# Patient Record
Sex: FEMALE | Race: WHITE | NOT HISPANIC OR LATINO | Employment: FULL TIME | URBAN - METROPOLITAN AREA
[De-identification: names, ages, dates, MRNs, and addresses within clinical notes are randomized per-mention and may not be internally consistent; named-entity substitution may affect disease eponyms.]

---

## 2019-03-16 ENCOUNTER — HOSPITAL ENCOUNTER (OUTPATIENT)
Dept: LAB | Facility: MEDICAL CENTER | Age: 29
End: 2019-03-16
Attending: STUDENT IN AN ORGANIZED HEALTH CARE EDUCATION/TRAINING PROGRAM
Payer: COMMERCIAL

## 2019-03-16 ENCOUNTER — HOSPITAL ENCOUNTER (OUTPATIENT)
Dept: LAB | Facility: MEDICAL CENTER | Age: 29
End: 2019-03-16
Attending: FAMILY MEDICINE
Payer: COMMERCIAL

## 2019-03-16 LAB
B-HCG SERPL-ACNC: <0.6 MIU/ML (ref 0–5)
BASOPHILS # BLD AUTO: 0.7 % (ref 0–1.8)
BASOPHILS # BLD: 0.05 K/UL (ref 0–0.12)
EOSINOPHIL # BLD AUTO: 0.08 K/UL (ref 0–0.51)
EOSINOPHIL NFR BLD: 1.1 % (ref 0–6.9)
ERYTHROCYTE [DISTWIDTH] IN BLOOD BY AUTOMATED COUNT: 43.9 FL (ref 35.9–50)
HCT VFR BLD AUTO: 44.3 % (ref 37–47)
HGB BLD-MCNC: 14.3 G/DL (ref 12–16)
IMM GRANULOCYTES # BLD AUTO: 0.01 K/UL (ref 0–0.11)
IMM GRANULOCYTES NFR BLD AUTO: 0.1 % (ref 0–0.9)
LYMPHOCYTES # BLD AUTO: 2.47 K/UL (ref 1–4.8)
LYMPHOCYTES NFR BLD: 34.5 % (ref 22–41)
MCH RBC QN AUTO: 30.5 PG (ref 27–33)
MCHC RBC AUTO-ENTMCNC: 32.3 G/DL (ref 33.6–35)
MCV RBC AUTO: 94.5 FL (ref 81.4–97.8)
MONOCYTES # BLD AUTO: 0.63 K/UL (ref 0–0.85)
MONOCYTES NFR BLD AUTO: 8.8 % (ref 0–13.4)
NEUTROPHILS # BLD AUTO: 3.91 K/UL (ref 2–7.15)
NEUTROPHILS NFR BLD: 54.8 % (ref 44–72)
NRBC # BLD AUTO: 0 K/UL
NRBC BLD-RTO: 0 /100 WBC
PLATELET # BLD AUTO: 187 K/UL (ref 164–446)
PMV BLD AUTO: 12.9 FL (ref 9–12.9)
RBC # BLD AUTO: 4.69 M/UL (ref 4.2–5.4)
TSH SERPL DL<=0.005 MIU/L-ACNC: 0.78 UIU/ML (ref 0.38–5.33)
WBC # BLD AUTO: 7.2 K/UL (ref 4.8–10.8)

## 2019-03-16 PROCEDURE — 84443 ASSAY THYROID STIM HORMONE: CPT

## 2019-03-16 PROCEDURE — 84702 CHORIONIC GONADOTROPIN TEST: CPT

## 2019-03-16 PROCEDURE — 85025 COMPLETE CBC W/AUTO DIFF WBC: CPT

## 2019-03-16 PROCEDURE — 36415 COLL VENOUS BLD VENIPUNCTURE: CPT

## 2023-04-03 ENCOUNTER — OFFICE VISIT (OUTPATIENT)
Dept: URGENT CARE | Facility: PHYSICIAN GROUP | Age: 33
End: 2023-04-03
Payer: COMMERCIAL

## 2023-04-03 VITALS
WEIGHT: 171 LBS | TEMPERATURE: 97.9 F | DIASTOLIC BLOOD PRESSURE: 80 MMHG | OXYGEN SATURATION: 96 % | BODY MASS INDEX: 31.47 KG/M2 | HEART RATE: 87 BPM | HEIGHT: 62 IN | RESPIRATION RATE: 18 BRPM | SYSTOLIC BLOOD PRESSURE: 120 MMHG

## 2023-04-03 DIAGNOSIS — H11.31 CONJUNCTIVAL HEMORRHAGE OF RIGHT EYE: ICD-10-CM

## 2023-04-03 PROCEDURE — 99203 OFFICE O/P NEW LOW 30 MIN: CPT | Performed by: FAMILY MEDICINE

## 2023-04-03 ASSESSMENT — ENCOUNTER SYMPTOMS
EYE REDNESS: 1
EYE PAIN: 1

## 2023-04-03 NOTE — PROGRESS NOTES
"Subjective:   Pamela Kerr is a 33 y.o. female who presents for Eye Pain (Right eye pain, x3 days )      Eye Pain   Associated symptoms include eye redness.     Review of Systems   Eyes:  Positive for pain and redness.     Medications, Allergies, and current problem list reviewed today in Epic.     Objective:     /80 (BP Location: Right arm, Patient Position: Sitting, BP Cuff Size: Adult)   Pulse 87   Temp 36.6 °C (97.9 °F) (Temporal)   Resp 18   Ht 1.562 m (5' 1.5\")   Wt 77.6 kg (171 lb)   SpO2 96%     Physical Exam  Vitals and nursing note reviewed.   Constitutional:       Appearance: Normal appearance.   HENT:      Head: Normocephalic and atraumatic.      Right Ear: Tympanic membrane normal.      Left Ear: Tympanic membrane normal.      Nose: Nose normal.      Mouth/Throat:      Pharynx: Oropharynx is clear.   Eyes:      Comments: Right conjunctiva hemorrage, lens clear, no retina normal appearing, no discharge   Neurological:      Mental Status: She is alert.       Assessment/Plan:     Diagnosis and associated orders:     1. Conjunctival hemorrhage of right eye           Comments/MDM:     Observation, tylenol for discomfort         Differential diagnosis, natural history, supportive care, and indications for immediate follow-up discussed.    Advised the patient to follow-up with the primary care physician for recheck, reevaluation, and consideration of further management.    Please note that this dictation was created using voice recognition software. I have made a reasonable attempt to correct obvious errors, but I expect that there are errors of grammar and possibly content that I did not discover before finalizing the note.    This note was electronically signed by Wilfrido Garber M.D.  "

## 2024-11-28 ENCOUNTER — APPOINTMENT (OUTPATIENT)
Dept: RADIOLOGY | Facility: MEDICAL CENTER | Age: 34
End: 2024-11-28
Attending: OBSTETRICS & GYNECOLOGY
Payer: COMMERCIAL

## 2024-11-28 ENCOUNTER — PHARMACY VISIT (OUTPATIENT)
Dept: PHARMACY | Facility: MEDICAL CENTER | Age: 34
End: 2024-11-28
Payer: COMMERCIAL

## 2024-11-28 ENCOUNTER — HOSPITAL ENCOUNTER (EMERGENCY)
Facility: MEDICAL CENTER | Age: 34
End: 2024-11-28
Attending: OBSTETRICS & GYNECOLOGY | Admitting: OBSTETRICS & GYNECOLOGY
Payer: COMMERCIAL

## 2024-11-28 VITALS
RESPIRATION RATE: 16 BRPM | BODY MASS INDEX: 36.8 KG/M2 | TEMPERATURE: 97.8 F | SYSTOLIC BLOOD PRESSURE: 142 MMHG | WEIGHT: 200 LBS | OXYGEN SATURATION: 98 % | HEART RATE: 98 BPM | DIASTOLIC BLOOD PRESSURE: 78 MMHG | HEIGHT: 62 IN

## 2024-11-28 LAB
ALBUMIN SERPL BCP-MCNC: 3.6 G/DL (ref 3.2–4.9)
ALBUMIN/GLOB SERPL: 1.1 G/DL
ALP SERPL-CCNC: 162 U/L (ref 30–99)
ALT SERPL-CCNC: 15 U/L (ref 2–50)
ANION GAP SERPL CALC-SCNC: 12 MMOL/L (ref 7–16)
ANISOCYTOSIS BLD QL SMEAR: ABNORMAL
APPEARANCE UR: CLEAR
APPEARANCE UR: CLEAR
AST SERPL-CCNC: 16 U/L (ref 12–45)
BASOPHILS # BLD AUTO: 0 % (ref 0–1.8)
BASOPHILS # BLD: 0 K/UL (ref 0–0.12)
BILIRUB SERPL-MCNC: 0.2 MG/DL (ref 0.1–1.5)
BILIRUB UR QL STRIP.AUTO: NEGATIVE
BILIRUB UR QL STRIP.AUTO: NEGATIVE
BUN SERPL-MCNC: 7 MG/DL (ref 8–22)
CALCIUM ALBUM COR SERPL-MCNC: 9.8 MG/DL (ref 8.5–10.5)
CALCIUM SERPL-MCNC: 9.5 MG/DL (ref 8.5–10.5)
CHLORIDE SERPL-SCNC: 103 MMOL/L (ref 96–112)
CO2 SERPL-SCNC: 21 MMOL/L (ref 20–33)
COLOR UR AUTO: YELLOW
COLOR UR: YELLOW
CREAT SERPL-MCNC: 0.51 MG/DL (ref 0.5–1.4)
CREAT UR-MCNC: 69.5 MG/DL
EOSINOPHIL # BLD AUTO: 0 K/UL (ref 0–0.51)
EOSINOPHIL NFR BLD: 0 % (ref 0–6.9)
ERYTHROCYTE [DISTWIDTH] IN BLOOD BY AUTOMATED COUNT: 45.5 FL (ref 35.9–50)
GFR SERPLBLD CREATININE-BSD FMLA CKD-EPI: 127 ML/MIN/1.73 M 2
GLOBULIN SER CALC-MCNC: 3.2 G/DL (ref 1.9–3.5)
GLUCOSE SERPL-MCNC: 75 MG/DL (ref 65–99)
GLUCOSE UR QL STRIP.AUTO: NEGATIVE MG/DL
GLUCOSE UR STRIP.AUTO-MCNC: NEGATIVE MG/DL
HCT VFR BLD AUTO: 40.3 % (ref 37–47)
HGB BLD-MCNC: 13.6 G/DL (ref 12–16)
KETONES UR QL STRIP.AUTO: NEGATIVE MG/DL
KETONES UR STRIP.AUTO-MCNC: NEGATIVE MG/DL
LEUKOCYTE ESTERASE UR QL STRIP.AUTO: ABNORMAL
LEUKOCYTE ESTERASE UR QL STRIP.AUTO: NEGATIVE
LG PLATELETS BLD QL SMEAR: NORMAL
LYMPHOCYTES # BLD AUTO: 1.65 K/UL (ref 1–4.8)
LYMPHOCYTES NFR BLD: 13.2 % (ref 22–41)
MANUAL DIFF BLD: NORMAL
MCH RBC QN AUTO: 30 PG (ref 27–33)
MCHC RBC AUTO-ENTMCNC: 33.7 G/DL (ref 32.2–35.5)
MCV RBC AUTO: 88.8 FL (ref 81.4–97.8)
MICRO URNS: NORMAL
MICROCYTES BLD QL SMEAR: ABNORMAL
MONOCYTES # BLD AUTO: 0.33 K/UL (ref 0–0.85)
MONOCYTES NFR BLD AUTO: 2.6 % (ref 0–13.4)
MORPHOLOGY BLD-IMP: NORMAL
NEUTROPHILS # BLD AUTO: 10.53 K/UL (ref 1.82–7.42)
NEUTROPHILS NFR BLD: 84.2 % (ref 44–72)
NITRITE UR QL STRIP.AUTO: NEGATIVE
NITRITE UR QL STRIP.AUTO: NEGATIVE
NRBC # BLD AUTO: 0 K/UL
NRBC BLD-RTO: 0 /100 WBC (ref 0–0.2)
PH UR STRIP.AUTO: 7.5 [PH] (ref 5–8)
PH UR STRIP.AUTO: 8 [PH] (ref 5–8)
PLATELET # BLD AUTO: 143 K/UL (ref 164–446)
PLATELET # BLD AUTO: 157 K/UL (ref 164–446)
PLATELET BLD QL SMEAR: NORMAL
PMV BLD AUTO: 13.6 FL (ref 9–12.9)
POLYCHROMASIA BLD QL SMEAR: NORMAL
POTASSIUM SERPL-SCNC: 3.9 MMOL/L (ref 3.6–5.5)
PROT SERPL-MCNC: 6.8 G/DL (ref 6–8.2)
PROT UR QL STRIP: NEGATIVE MG/DL
PROT UR QL STRIP: NEGATIVE MG/DL
PROT UR-MCNC: 12.3 MG/DL (ref 0–15)
PROT/CREAT UR: 177 MG/G (ref 10–107)
RBC # BLD AUTO: 4.54 M/UL (ref 4.2–5.4)
RBC BLD AUTO: PRESENT
RBC UR QL AUTO: NEGATIVE
RBC UR QL AUTO: NEGATIVE
SODIUM SERPL-SCNC: 136 MMOL/L (ref 135–145)
SP GR UR STRIP.AUTO: 1.02
SP GR UR STRIP.AUTO: 1.02 (ref 1–1.03)
URATE SERPL-MCNC: 5.8 MG/DL (ref 1.9–8.2)
UROBILINOGEN UR STRIP.AUTO-MCNC: 0.2 EU/DL
UROBILINOGEN UR STRIP.AUTO-MCNC: 0.2 MG/DL
WBC # BLD AUTO: 12.5 K/UL (ref 4.8–10.8)

## 2024-11-28 PROCEDURE — 700102 HCHG RX REV CODE 250 W/ 637 OVERRIDE(OP): Performed by: OBSTETRICS & GYNECOLOGY

## 2024-11-28 PROCEDURE — 81003 URINALYSIS AUTO W/O SCOPE: CPT

## 2024-11-28 PROCEDURE — A9270 NON-COVERED ITEM OR SERVICE: HCPCS | Performed by: OBSTETRICS & GYNECOLOGY

## 2024-11-28 PROCEDURE — 36415 COLL VENOUS BLD VENIPUNCTURE: CPT

## 2024-11-28 PROCEDURE — 82570 ASSAY OF URINE CREATININE: CPT

## 2024-11-28 PROCEDURE — 84550 ASSAY OF BLOOD/URIC ACID: CPT

## 2024-11-28 PROCEDURE — RXMED WILLOW AMBULATORY MEDICATION CHARGE: Performed by: OBSTETRICS & GYNECOLOGY

## 2024-11-28 PROCEDURE — 84156 ASSAY OF PROTEIN URINE: CPT

## 2024-11-28 PROCEDURE — 81002 URINALYSIS NONAUTO W/O SCOPE: CPT

## 2024-11-28 PROCEDURE — 93971 EXTREMITY STUDY: CPT | Mod: RT

## 2024-11-28 PROCEDURE — 85027 COMPLETE CBC AUTOMATED: CPT

## 2024-11-28 PROCEDURE — 99283 EMERGENCY DEPT VISIT LOW MDM: CPT

## 2024-11-28 PROCEDURE — 59025 FETAL NON-STRESS TEST: CPT

## 2024-11-28 PROCEDURE — 80053 COMPREHEN METABOLIC PANEL: CPT

## 2024-11-28 PROCEDURE — 85007 BL SMEAR W/DIFF WBC COUNT: CPT

## 2024-11-28 RX ORDER — LABETALOL 100 MG/1
TABLET, FILM COATED ORAL
Qty: 60 TABLET | Refills: 0 | OUTPATIENT
Start: 2024-11-28

## 2024-11-28 RX ORDER — LABETALOL 100 MG/1
100 TABLET, FILM COATED ORAL ONCE
Status: COMPLETED | OUTPATIENT
Start: 2024-11-28 | End: 2024-11-28

## 2024-11-28 RX ADMIN — LABETALOL HYDROCHLORIDE 100 MG: 100 TABLET, FILM COATED ORAL at 17:08

## 2024-11-28 NOTE — PROGRESS NOTES
1440 - Patient of Dr. Crooks presents with c/o various findings s/p elevated BP precautions.  Brar Gestation today at 36.3 weeks    Patient reports she had an elevated BP at the office yesterday of 180s. States she stayed for about an hour listening to relaxing music and resting and the BP resolved. She was given precautions and told to go to the hospital should she experience any of them.     Reports she has had a HA this week that resolved with tylenol. And occasional vision changes while in the process of standing or in the shower.    Today patient reports right leg/ankle swelling around 11am after walking around the store - resolved with rest/elevation/ice but then became swollen again after she resumed activity. Denies pain or discomfort to the leg but states she experienced a couple hours of discomfort to the right groin on Sunday.     DTRs 1-2+   without clonus     No areas of redness/heat noted to the right leg, no swelling of either extremity noted - slight indention on ankle from the sock elastic, bilaterally.     Denies contractions/cramping - report occasional BH contractions. Denies ROM or bleeding. Denies current problem to vision/edema/HA. Reports FM. FM/TOCO use discussed and placed, POC discussed. DAVE Darling at the .     Dry erase board updated with RN contact information; reviewed.   Patient encouraged to call RN with all questions concerns needs prn. Water available/encouraged at the BS - patients own water.     1511 - Report to Dr. Reeves regarding patient arrival/complaint/status. Orders received for PCR, CMP, CBC, uric acid, US venous doppler of lower right extremity. POC discussed with the patient.     1600 - Patient denies s/s of a UTI, reports she is susceptible to frequent UTI's though.     1605 - US/Tech at the BS     1635 - Lab called to say the platelets are clumping and need to be drawn again. Update to patient.  1640 - Phlebotomist at the .     1659 - Order received for 100  labetalol now. Ordered. Discussed with the patient/FOB.   1708 - Administered    1745 - Dr. Reeves is at the  assessing current maternal/fetal status and POC for discharge home and follow up at scheduled appointment this Wednesday 1810 - Patient discharged home with specific instruction to return to L&D/Physician ie.. Bleeding/ROM/decreased FM/labor/concerns for self or baby..       Of side note > Volume on the TV remove is not working - remote replace did not resolve the issue. Patient offered to change rooms, denies need.

## 2024-11-29 NOTE — DISCHARGE INSTRUCTIONS
Hypertension During Pregnancy  Hypertension is also called high blood pressure. High blood pressure means that the force of the blood moving in your body is high enough to cause problems for you and your baby. Different types of high blood pressure can happen during pregnancy. The types are:  High blood pressure before you got pregnant. This is called chronic hypertension.  This can continue during your pregnancy. Your doctor will want to keep checking your blood pressure. You may need medicine to control your blood pressure while you are pregnant. You will need follow-up visits after you have your baby.  High blood pressure that goes up during pregnancy when it was normal before. This is called gestational hypertension. It will often get better after you have your baby, but your doctor will need to watch your blood pressure to make sure that it is getting better.  You may develop high blood pressure after giving birth. This is called postpartum hypertension. This often occurs within 48 hours after childbirth but may occur up to 6 weeks after giving birth.  Very high blood pressure during pregnancy is an emergency that needs treatment right away.  How does this affect me?  If you have high blood pressure during pregnancy, you have a higher chance of developing high blood pressure:  As you get older.  If you get pregnant again.  In some cases, high blood pressure during pregnancy can cause:  Stroke.  Heart attack.  Damage to the kidneys, lungs, or liver.  Preeclampsia.  HELLP syndrome.  Seizures.  Problems with the placenta.  How does this affect my baby?  Your baby may:  Be born early.  Not weigh as much as he or she should.  Not handle labor well, leading to a .  This condition may also result in a baby's death before birth (stillbirth).  What are the risks?  Having high blood pressure during a past pregnancy.  Being overweight.  Being age 35 or older.  Being pregnant for the first time.  Being pregnant  with more than one baby.  Becoming pregnant using fertility methods, such as IVF.  Having other problems, such as diabetes or kidney disease.  What can I do to lower my risk?    Keep a healthy weight.  Eat a healthy diet.  Follow what your doctor tells you about treating any medical problems that you had before you got pregnant.  It is very important to go to all of your doctor visits. Your doctor will check your blood pressure and make sure that your pregnancy is progressing as it should. Treatment should start early if a problem is found.  How is this treated?  Treatment for high blood pressure during pregnancy can vary. It depends on the type of high blood pressure you have and how serious it is.  If you were taking medicine for your blood pressure before you got pregnant, talk with your doctor. You may need to change the medicine during pregnancy if it is not safe for your baby.  If your blood pressure goes up during pregnancy, your doctor may order medicine to treat this.  If you are at risk for preeclampsia, your doctor may tell you to take a low-dose aspirin while you are pregnant.  If you have very high blood pressure, you may need to stay in the hospital so you and your baby can be watched closely. You may also need to take medicine to lower your blood pressure.  In some cases, if your condition gets worse, you may need to have your baby early.  Follow these instructions at home:  Eating and drinking    Drink enough fluid to keep your pee (urine) pale yellow.  Avoid caffeine.  Lifestyle  Do not smoke or use any products that contain nicotine or tobacco. If you need help quitting, ask your doctor.  Do not use alcohol or drugs.  Avoid stress.  Rest and get plenty of sleep.  Regular exercise can help. Ask your doctor what kinds of exercise are best for you.  General instructions  Take over-the-counter and prescription medicines only as told by your doctor.  Keep all prenatal and follow-up visits.  Contact a  doctor if:  You have symptoms that your doctor told you to watch for, such as:  Headaches.  A feeling like you may vomit (nausea).  Vomiting.  Belly (abdominal) pain.  Feeling dizzy or light-headed.  Get help right away if:  You have symptoms of serious problems, such as:  Very bad belly pain that does not get better with treatment.  A very bad headache that does not get better.  Blurry vision.  Double vision.  Vomiting that does not get better.  Sudden, fast weight gain.  Sudden swelling in your hands, ankles, or face.  Bleeding from your vagina.  Blood in your pee.  Shortness of breath.  Chest pain.  Weakness on one side of your body.  Trouble talking.  Your baby is not moving as much as usual.  These symptoms may be an emergency. Get help right away. Call your local emergency services (911 in the [place]  Do not wait to see if the symptoms will go away.  Do not drive yourself to the hospital.  Summary  High blood pressure is also called hypertension.  High blood pressure means that the force of the blood moving in your body is high enough to cause problems for you and your baby.  Get help right away if you have symptoms of serious problems due to high blood pressure.  Keep all prenatal and follow-up visits.  This information is not intended to replace advice given to you by your health care provider. Make sure you discuss any questions you have with your health care provider.  Document Revised: 09/09/2021 Document Reviewed: 09/09/2021  [employer] Patient Education © 2023 [employer]

## 2024-11-29 NOTE — PROGRESS NOTES
OB ED note    Pt called with increased swelling in her right LE.  Was seen yesterday by her Primary Ob, Dr. Crooks, and her BP was 182/100.  Pt rested and BP decreased.  She was sent home and instructed to monitor for swelling and HA.  Pt noticed swelling in her right LE which resolved with elevation and ice, but then returned. She also had right groin pain last week. She was advised to come in for additional evaluation.       - 149  DBP 80 - 95 P 97  T 97.8  FHTs 140s Cat 1  [place] Occasional  LE: No edema, no cords or Homans.  DTRs 2+, no clonus  Labs  Plts 157K  AST 16 ALT 15  Uric Acid 5.8  Pro/Cr ratio 177  LE Doppler - no DVT    A/P:  IUP at 36 3/7 weeks - likely gestational HTN - doing well   Pt given Labetalol 100 mg PO and BP improved. Rx called into [facility] outpatient pharmacy.  Pt advise to obtain BP cuff.  Parameters reviewed  F/U Dr. Crooks as scheduled  Return to L&D for PTL or sx of pre-eclampsia

## 2024-12-01 ENCOUNTER — HOSPITAL ENCOUNTER (EMERGENCY)
Facility: MEDICAL CENTER | Age: 34
End: 2024-12-01
Attending: OBSTETRICS & GYNECOLOGY | Admitting: OBSTETRICS & GYNECOLOGY
Payer: COMMERCIAL

## 2024-12-01 VITALS
RESPIRATION RATE: 16 BRPM | DIASTOLIC BLOOD PRESSURE: 88 MMHG | HEART RATE: 94 BPM | OXYGEN SATURATION: 98 % | BODY MASS INDEX: 36.8 KG/M2 | WEIGHT: 200 LBS | SYSTOLIC BLOOD PRESSURE: 149 MMHG | HEIGHT: 62 IN | TEMPERATURE: 97.3 F

## 2024-12-01 LAB
A1 MICROGLOB PLACENTAL VAG QL: NEGATIVE
ALBUMIN SERPL BCP-MCNC: 3.7 G/DL (ref 3.2–4.9)
ALBUMIN/GLOB SERPL: 1.3 G/DL
ALP SERPL-CCNC: 162 U/L (ref 30–99)
ALT SERPL-CCNC: 17 U/L (ref 2–50)
ANION GAP SERPL CALC-SCNC: 15 MMOL/L (ref 7–16)
AST SERPL-CCNC: 17 U/L (ref 12–45)
BILIRUB SERPL-MCNC: 0.2 MG/DL (ref 0.1–1.5)
BUN SERPL-MCNC: 8 MG/DL (ref 8–22)
CALCIUM ALBUM COR SERPL-MCNC: 9.9 MG/DL (ref 8.5–10.5)
CALCIUM SERPL-MCNC: 9.7 MG/DL (ref 8.5–10.5)
CHLORIDE SERPL-SCNC: 104 MMOL/L (ref 96–112)
CO2 SERPL-SCNC: 18 MMOL/L (ref 20–33)
CREAT SERPL-MCNC: 0.57 MG/DL (ref 0.5–1.4)
CREAT UR-MCNC: 63.22 MG/DL
ERYTHROCYTE [DISTWIDTH] IN BLOOD BY AUTOMATED COUNT: 46.4 FL (ref 35.9–50)
GFR SERPLBLD CREATININE-BSD FMLA CKD-EPI: 123 ML/MIN/1.73 M 2
GLOBULIN SER CALC-MCNC: 2.9 G/DL (ref 1.9–3.5)
GLUCOSE SERPL-MCNC: 124 MG/DL (ref 65–99)
HCT VFR BLD AUTO: 37.7 % (ref 37–47)
HGB BLD-MCNC: 12.7 G/DL (ref 12–16)
HOLDING TUBE BB 8507: NORMAL
MCH RBC QN AUTO: 30.2 PG (ref 27–33)
MCHC RBC AUTO-ENTMCNC: 33.7 G/DL (ref 32.2–35.5)
MCV RBC AUTO: 89.5 FL (ref 81.4–97.8)
PLATELET # BLD AUTO: 155 K/UL (ref 164–446)
PMV BLD AUTO: 14.2 FL (ref 9–12.9)
POTASSIUM SERPL-SCNC: 3.7 MMOL/L (ref 3.6–5.5)
PROT SERPL-MCNC: 6.6 G/DL (ref 6–8.2)
PROT UR-MCNC: 7 MG/DL (ref 0–15)
PROT/CREAT UR: 111 MG/G (ref 10–107)
RBC # BLD AUTO: 4.21 M/UL (ref 4.2–5.4)
SODIUM SERPL-SCNC: 137 MMOL/L (ref 135–145)
WBC # BLD AUTO: 12.4 K/UL (ref 4.8–10.8)

## 2024-12-01 PROCEDURE — 85027 COMPLETE CBC AUTOMATED: CPT

## 2024-12-01 PROCEDURE — 99282 EMERGENCY DEPT VISIT SF MDM: CPT

## 2024-12-01 PROCEDURE — 59025 FETAL NON-STRESS TEST: CPT

## 2024-12-01 PROCEDURE — 82570 ASSAY OF URINE CREATININE: CPT

## 2024-12-01 PROCEDURE — 84156 ASSAY OF PROTEIN URINE: CPT

## 2024-12-01 PROCEDURE — 80053 COMPREHEN METABOLIC PANEL: CPT

## 2024-12-01 PROCEDURE — 84112 EVAL AMNIOTIC FLUID PROTEIN: CPT

## 2024-12-01 PROCEDURE — 36415 COLL VENOUS BLD VENIPUNCTURE: CPT

## 2024-12-01 ASSESSMENT — FIBROSIS 4 INDEX: FIB4 SCORE: 0.84

## 2024-12-01 ASSESSMENT — PAIN SCALES - GENERAL: PAINLEVEL: 0 - NO PAIN

## 2024-12-02 NOTE — PROGRESS NOTES
", EDC , [place] 37w. Patient presents to L&D with c/o elevated BP (130-152/). Patient denies VB or UCs and reports + fetal movement. Patient states she woke up from a nap at approx 1300 \"feeling wet down there.\" Patient escorted to triage room, oriented to room and unit, and external monitors applied. POC discussed with patient and s/o and encouraged to state needs or questions at any time.    1529 Dr. Santiago given report, orders received.    1624 Dr. Santiago updated, D/C order received. Will see patient prior to D/C.    1635 Dr. Santiago at bedside, POC discussed with patient.    1651 L&D D/C instructions reviewed with patient and s/o who state understanding. Patient d/c to self with s/o.  "

## 2024-12-03 ENCOUNTER — ANESTHESIA EVENT (OUTPATIENT)
Dept: ANESTHESIOLOGY | Facility: MEDICAL CENTER | Age: 34
End: 2024-12-03
Payer: COMMERCIAL

## 2024-12-03 ENCOUNTER — ANESTHESIA (OUTPATIENT)
Dept: ANESTHESIOLOGY | Facility: MEDICAL CENTER | Age: 34
End: 2024-12-03
Payer: COMMERCIAL

## 2024-12-03 ENCOUNTER — APPOINTMENT (OUTPATIENT)
Dept: OBGYN | Facility: MEDICAL CENTER | Age: 34
End: 2024-12-03
Attending: OBSTETRICS & GYNECOLOGY
Payer: COMMERCIAL

## 2024-12-03 ENCOUNTER — HOSPITAL ENCOUNTER (INPATIENT)
Facility: MEDICAL CENTER | Age: 34
LOS: 2 days | End: 2024-12-05
Attending: OBSTETRICS & GYNECOLOGY | Admitting: OBSTETRICS & GYNECOLOGY
Payer: COMMERCIAL

## 2024-12-03 DIAGNOSIS — Z78.9 BREASTFEEDING (INFANT): ICD-10-CM

## 2024-12-03 LAB
BASOPHILS # BLD AUTO: 0.3 % (ref 0–1.8)
BASOPHILS # BLD: 0.04 K/UL (ref 0–0.12)
EOSINOPHIL # BLD AUTO: 0.1 K/UL (ref 0–0.51)
EOSINOPHIL NFR BLD: 0.9 % (ref 0–6.9)
ERYTHROCYTE [DISTWIDTH] IN BLOOD BY AUTOMATED COUNT: 46.3 FL (ref 35.9–50)
HCT VFR BLD AUTO: 38.1 % (ref 37–47)
HGB BLD-MCNC: 12.5 G/DL (ref 12–16)
HOLDING TUBE BB 8507: NORMAL
IMM GRANULOCYTES # BLD AUTO: 0.07 K/UL (ref 0–0.11)
IMM GRANULOCYTES NFR BLD AUTO: 0.6 % (ref 0–0.9)
LYMPHOCYTES # BLD AUTO: 2.6 K/UL (ref 1–4.8)
LYMPHOCYTES NFR BLD: 22.7 % (ref 22–41)
MCH RBC QN AUTO: 29.3 PG (ref 27–33)
MCHC RBC AUTO-ENTMCNC: 32.8 G/DL (ref 32.2–35.5)
MCV RBC AUTO: 89.4 FL (ref 81.4–97.8)
MONOCYTES # BLD AUTO: 0.95 K/UL (ref 0–0.85)
MONOCYTES NFR BLD AUTO: 8.3 % (ref 0–13.4)
NEUTROPHILS # BLD AUTO: 7.7 K/UL (ref 1.82–7.42)
NEUTROPHILS NFR BLD: 67.2 % (ref 44–72)
NRBC # BLD AUTO: 0 K/UL
NRBC BLD-RTO: 0 /100 WBC (ref 0–0.2)
PLATELET # BLD AUTO: 166 K/UL (ref 164–446)
PMV BLD AUTO: 14.3 FL (ref 9–12.9)
RBC # BLD AUTO: 4.26 M/UL (ref 4.2–5.4)
T PALLIDUM AB SER QL IA: NORMAL
WBC # BLD AUTO: 11.5 K/UL (ref 4.8–10.8)

## 2024-12-03 PROCEDURE — 700111 HCHG RX REV CODE 636 W/ 250 OVERRIDE (IP): Mod: JZ | Performed by: ANESTHESIOLOGY

## 2024-12-03 PROCEDURE — 700111 HCHG RX REV CODE 636 W/ 250 OVERRIDE (IP): Mod: JZ | Performed by: OBSTETRICS & GYNECOLOGY

## 2024-12-03 PROCEDURE — 700102 HCHG RX REV CODE 250 W/ 637 OVERRIDE(OP): Performed by: OBSTETRICS & GYNECOLOGY

## 2024-12-03 PROCEDURE — 770002 HCHG ROOM/CARE - OB PRIVATE (112)

## 2024-12-03 PROCEDURE — 700105 HCHG RX REV CODE 258: Performed by: OBSTETRICS & GYNECOLOGY

## 2024-12-03 PROCEDURE — 700101 HCHG RX REV CODE 250: Performed by: ANESTHESIOLOGY

## 2024-12-03 PROCEDURE — 36415 COLL VENOUS BLD VENIPUNCTURE: CPT

## 2024-12-03 PROCEDURE — 303615 HCHG EPIDURAL/SPINAL ANESTHESIA FOR LABOR

## 2024-12-03 PROCEDURE — 86780 TREPONEMA PALLIDUM: CPT

## 2024-12-03 PROCEDURE — 86901 BLOOD TYPING SEROLOGIC RH(D): CPT

## 2024-12-03 PROCEDURE — 85025 COMPLETE CBC W/AUTO DIFF WBC: CPT

## 2024-12-03 PROCEDURE — 86900 BLOOD TYPING SEROLOGIC ABO: CPT

## 2024-12-03 PROCEDURE — A9270 NON-COVERED ITEM OR SERVICE: HCPCS | Performed by: OBSTETRICS & GYNECOLOGY

## 2024-12-03 RX ORDER — CARBOPROST TROMETHAMINE 250 UG/ML
250 INJECTION, SOLUTION INTRAMUSCULAR
Status: DISCONTINUED | OUTPATIENT
Start: 2024-12-03 | End: 2024-12-04 | Stop reason: HOSPADM

## 2024-12-03 RX ORDER — SODIUM CHLORIDE, SODIUM LACTATE, POTASSIUM CHLORIDE, AND CALCIUM CHLORIDE .6; .31; .03; .02 G/100ML; G/100ML; G/100ML; G/100ML
250 INJECTION, SOLUTION INTRAVENOUS PRN
Status: DISCONTINUED | OUTPATIENT
Start: 2024-12-03 | End: 2024-12-04

## 2024-12-03 RX ORDER — ROPIVACAINE HYDROCHLORIDE 2 MG/ML
INJECTION, SOLUTION EPIDURAL; INFILTRATION; PERINEURAL CONTINUOUS
Status: DISCONTINUED | OUTPATIENT
Start: 2024-12-03 | End: 2024-12-04

## 2024-12-03 RX ORDER — ALUMINA, MAGNESIA, AND SIMETHICONE 2400; 2400; 240 MG/30ML; MG/30ML; MG/30ML
30 SUSPENSION ORAL EVERY 6 HOURS PRN
Status: DISCONTINUED | OUTPATIENT
Start: 2024-12-03 | End: 2024-12-04 | Stop reason: HOSPADM

## 2024-12-03 RX ORDER — ACETAMINOPHEN 500 MG
1000 TABLET ORAL
Status: COMPLETED | OUTPATIENT
Start: 2024-12-03 | End: 2024-12-04

## 2024-12-03 RX ORDER — MISOPROSTOL 200 UG/1
800 TABLET ORAL
Status: DISCONTINUED | OUTPATIENT
Start: 2024-12-03 | End: 2024-12-04 | Stop reason: HOSPADM

## 2024-12-03 RX ORDER — ONDANSETRON 2 MG/ML
4 INJECTION INTRAMUSCULAR; INTRAVENOUS EVERY 6 HOURS PRN
Status: DISCONTINUED | OUTPATIENT
Start: 2024-12-03 | End: 2024-12-04 | Stop reason: HOSPADM

## 2024-12-03 RX ORDER — LABETALOL 100 MG/1
100 TABLET, FILM COATED ORAL TWICE DAILY
Status: DISCONTINUED | OUTPATIENT
Start: 2024-12-03 | End: 2024-12-04

## 2024-12-03 RX ORDER — NIFEDIPINE 10 MG/1
10 CAPSULE ORAL
Status: DISCONTINUED | OUTPATIENT
Start: 2024-12-03 | End: 2024-12-04 | Stop reason: HOSPADM

## 2024-12-03 RX ORDER — EPHEDRINE SULFATE 50 MG/ML
5 INJECTION, SOLUTION INTRAVENOUS
Status: DISCONTINUED | OUTPATIENT
Start: 2024-12-03 | End: 2024-12-04

## 2024-12-03 RX ORDER — ASPIRIN 81 MG/1
81 TABLET ORAL
Status: ON HOLD | COMMUNITY
End: 2024-12-05

## 2024-12-03 RX ORDER — ONDANSETRON 4 MG/1
4 TABLET, ORALLY DISINTEGRATING ORAL EVERY 6 HOURS PRN
Status: DISCONTINUED | OUTPATIENT
Start: 2024-12-03 | End: 2024-12-04 | Stop reason: HOSPADM

## 2024-12-03 RX ORDER — HYDRALAZINE HYDROCHLORIDE 20 MG/ML
5-10 INJECTION INTRAMUSCULAR; INTRAVENOUS
Status: DISCONTINUED | OUTPATIENT
Start: 2024-12-03 | End: 2024-12-04 | Stop reason: HOSPADM

## 2024-12-03 RX ORDER — LIDOCAINE HYDROCHLORIDE AND EPINEPHRINE 15; 5 MG/ML; UG/ML
INJECTION, SOLUTION EPIDURAL
Status: COMPLETED | OUTPATIENT
Start: 2024-12-03 | End: 2024-12-03

## 2024-12-03 RX ORDER — LABETALOL HYDROCHLORIDE 5 MG/ML
20-80 INJECTION, SOLUTION INTRAVENOUS
Status: DISCONTINUED | OUTPATIENT
Start: 2024-12-03 | End: 2024-12-04 | Stop reason: HOSPADM

## 2024-12-03 RX ORDER — IBUPROFEN 800 MG/1
800 TABLET, FILM COATED ORAL
Status: COMPLETED | OUTPATIENT
Start: 2024-12-03 | End: 2024-12-04

## 2024-12-03 RX ORDER — SODIUM PHOSPHATE,MONO-DIBASIC 19G-7G/118
1 ENEMA (ML) RECTAL
Status: DISCONTINUED | OUTPATIENT
Start: 2024-12-03 | End: 2024-12-04 | Stop reason: HOSPADM

## 2024-12-03 RX ORDER — SODIUM CHLORIDE, SODIUM LACTATE, POTASSIUM CHLORIDE, CALCIUM CHLORIDE 600; 310; 30; 20 MG/100ML; MG/100ML; MG/100ML; MG/100ML
INJECTION, SOLUTION INTRAVENOUS CONTINUOUS
Status: DISCONTINUED | OUTPATIENT
Start: 2024-12-03 | End: 2024-12-04

## 2024-12-03 RX ORDER — SODIUM CHLORIDE, SODIUM LACTATE, POTASSIUM CHLORIDE, AND CALCIUM CHLORIDE .6; .31; .03; .02 G/100ML; G/100ML; G/100ML; G/100ML
1000 INJECTION, SOLUTION INTRAVENOUS
Status: DISCONTINUED | OUTPATIENT
Start: 2024-12-03 | End: 2024-12-04

## 2024-12-03 RX ORDER — LIDOCAINE HYDROCHLORIDE 10 MG/ML
20 INJECTION, SOLUTION INFILTRATION; PERINEURAL
Status: DISCONTINUED | OUTPATIENT
Start: 2024-12-03 | End: 2024-12-04 | Stop reason: HOSPADM

## 2024-12-03 RX ORDER — TERBUTALINE SULFATE 1 MG/ML
0.25 INJECTION, SOLUTION SUBCUTANEOUS
Status: DISCONTINUED | OUTPATIENT
Start: 2024-12-03 | End: 2024-12-04 | Stop reason: HOSPADM

## 2024-12-03 RX ORDER — OXYTOCIN 10 [USP'U]/ML
10 INJECTION, SOLUTION INTRAMUSCULAR; INTRAVENOUS
Status: DISCONTINUED | OUTPATIENT
Start: 2024-12-03 | End: 2024-12-04 | Stop reason: HOSPADM

## 2024-12-03 RX ADMIN — SODIUM CHLORIDE, POTASSIUM CHLORIDE, SODIUM LACTATE AND CALCIUM CHLORIDE: 600; 310; 30; 20 INJECTION, SOLUTION INTRAVENOUS at 13:45

## 2024-12-03 RX ADMIN — ROPIVACAINE HYDROCHLORIDE: 2 INJECTION, SOLUTION EPIDURAL; INFILTRATION at 16:34

## 2024-12-03 RX ADMIN — LABETALOL HYDROCHLORIDE 100 MG: 100 TABLET, FILM COATED ORAL at 20:38

## 2024-12-03 RX ADMIN — OXYTOCIN 1 MILLI-UNITS/MIN: 10 INJECTION, SOLUTION INTRAMUSCULAR; INTRAVENOUS at 13:50

## 2024-12-03 RX ADMIN — LABETALOL HYDROCHLORIDE 100 MG: 100 TABLET, FILM COATED ORAL at 08:03

## 2024-12-03 RX ADMIN — SODIUM CHLORIDE, POTASSIUM CHLORIDE, SODIUM LACTATE AND CALCIUM CHLORIDE: 600; 310; 30; 20 INJECTION, SOLUTION INTRAVENOUS at 17:40

## 2024-12-03 RX ADMIN — MISOPROSTOL 25 MCG: 100 TABLET ORAL at 05:09

## 2024-12-03 RX ADMIN — LIDOCAINE HYDROCHLORIDE,EPINEPHRINE BITARTRATE 3 ML: 15; .005 INJECTION, SOLUTION EPIDURAL; INFILTRATION; INTRACAUDAL; PERINEURAL at 16:34

## 2024-12-03 SDOH — ECONOMIC STABILITY: TRANSPORTATION INSECURITY
IN THE PAST 12 MONTHS, HAS THE LACK OF TRANSPORTATION KEPT YOU FROM MEDICAL APPOINTMENTS OR FROM GETTING MEDICATIONS?: NO

## 2024-12-03 SDOH — ECONOMIC STABILITY: TRANSPORTATION INSECURITY
IN THE PAST 12 MONTHS, HAS LACK OF RELIABLE TRANSPORTATION KEPT YOU FROM MEDICAL APPOINTMENTS, MEETINGS, WORK OR FROM GETTING THINGS NEEDED FOR DAILY LIVING?: NO

## 2024-12-03 ASSESSMENT — SOCIAL DETERMINANTS OF HEALTH (SDOH)
IN THE PAST 12 MONTHS, HAS THE ELECTRIC, GAS, OIL, OR WATER COMPANY THREATENED TO SHUT OFF SERVICE IN YOUR HOME?: NO
WITHIN THE LAST YEAR, HAVE TO BEEN RAPED OR FORCED TO HAVE ANY KIND OF SEXUAL ACTIVITY BY YOUR PARTNER OR EX-PARTNER?: NO
WITHIN THE LAST YEAR, HAVE YOU BEEN KICKED, HIT, SLAPPED, OR OTHERWISE PHYSICALLY HURT BY YOUR PARTNER OR EX-PARTNER?: NO
WITHIN THE LAST YEAR, HAVE YOU BEEN HUMILIATED OR EMOTIONALLY ABUSED IN OTHER WAYS BY YOUR PARTNER OR EX-PARTNER?: NO
WITHIN THE PAST 12 MONTHS, THE FOOD YOU BOUGHT JUST DIDN'T LAST AND YOU DIDN'T HAVE MONEY TO GET MORE: NEVER TRUE
WITHIN THE LAST YEAR, HAVE YOU BEEN AFRAID OF YOUR PARTNER OR EX-PARTNER?: NO
WITHIN THE PAST 12 MONTHS, YOU WORRIED THAT YOUR FOOD WOULD RUN OUT BEFORE YOU GOT THE MONEY TO BUY MORE: NEVER TRUE

## 2024-12-03 ASSESSMENT — PATIENT HEALTH QUESTIONNAIRE - PHQ9
1. LITTLE INTEREST OR PLEASURE IN DOING THINGS: NOT AT ALL
2. FEELING DOWN, DEPRESSED, IRRITABLE, OR HOPELESS: NOT AT ALL
SUM OF ALL RESPONSES TO PHQ9 QUESTIONS 1 AND 2: 0

## 2024-12-03 ASSESSMENT — PAIN SCALES - GENERAL: PAINLEVEL: 0 - NO PAIN

## 2024-12-03 ASSESSMENT — PAIN DESCRIPTION - PAIN TYPE
TYPE: ACUTE PAIN

## 2024-12-03 ASSESSMENT — LIFESTYLE VARIABLES
TOTAL SCORE: 0
TOTAL SCORE: 0
HAVE PEOPLE ANNOYED YOU BY CRITICIZING YOUR DRINKING: NO
EVER HAD A DRINK FIRST THING IN THE MORNING TO STEADY YOUR NERVES TO GET RID OF A HANGOVER: NO
CONSUMPTION TOTAL: INCOMPLETE
EVER FELT BAD OR GUILTY ABOUT YOUR DRINKING: NO
ALCOHOL_USE: NO
TOTAL SCORE: 0
HAVE YOU EVER FELT YOU SHOULD CUT DOWN ON YOUR DRINKING: NO

## 2024-12-03 ASSESSMENT — FIBROSIS 4 INDEX: FIB4 SCORE: 0.85

## 2024-12-03 NOTE — CARE PLAN
Problem: Knowledge Deficit - L&D  Goal: Patient and family/caregivers will demonstrate understanding of plan of care, disease process/condition, diagnostic tests and medications  Outcome: Progressing  Note: Continuous education with  care      Problem: Psychosocial - L&D  Goal: Patient's level of anxiety will decrease  Outcome: Progressing  Goal: Patient will be able to discuss coping skills during hospitalization  Outcome: Progressing  Note: Good support from family and staff   Goal: Patient's ability to re-evaluate and adapt role responsibilities will improve  Outcome: Progressing     Problem: Risk for Venous Thromboembolism (VTE)  Goal: VTE prevention measures will be implemented and patient will remain free from VTE  Outcome: Progressing  Note: Able to ambulate and move self in bed      Problem: Risk for Infection and Impaired Wound Healing  Goal: Patient will remain free from infection  Outcome: Progressing  Note: Routine VS and temp to detect infection, skin temp assessment when providing care for the pt.       Problem: Risk for Fluid Imbalance  Goal: Patient's fluid volume balance will be maintained or improve  Outcome: Progressing  Note: Oral hydrating, will start IV fluids with other additional IV meds      Problem: Risk for Injury  Goal: Patient and fetus will be free of preventable injury/complications  Outcome: Progressing  Note: Call light in reach when in need of assistance. PT aware of cords and other tripping hazards in room.     Problem: Pain  Goal: Patient's pain will be alleviated or reduced to the patient’s comfort goal  Outcome: Progressing  Note: Pain assessed. Routine assessment    The patient is Stable - Low risk of patient condition declining or worsening    Shift Goals  Clinical Goals: Healthy mom, healthy baby  Patient Goals: vaginal del    Progress made toward(s) clinical / shift goals:  progressing

## 2024-12-03 NOTE — PROGRESS NOTES
"Labor Progress Note    Pamela Vazquez Usha     1.  Intrauterine pregnancy at 37 weeks and 2day.  2.  Gestational hypertension without severe features.  3.  Rh negative.  4.  Asthma.  5.  GBS negative.    Subjective:  Pt without PIH sx.  Uterine contractions:yes  Pain: Yes. Severity: mild    Objective:   Vitals:    12/03/24 0414 12/03/24 0415 12/03/24 0653 12/03/24 0654   BP: 129/75   129/73   Pulse: 90 94 96 85   Resp: 18   17   Temp: 36.2 °C (97.1 °F)   36.2 °C (97.2 °F)   TempSrc: Temporal   Temporal   SpO2: 97% 97% 98% 98%   Weight: 90.7 kg (200 lb)      Height: 1.575 m (5' 2\")        FHT:  140's cat 1  [place]: q 2-3  SVE:1-2/50/-2 with srom, clear at 10:50 am.     Membranes ruptured: .yes    Meds:   Epidural : .no  Pitocin: .no    Labs:  Recent Results (from the past 24 hours)   Hold Blood Bank Specimen (Not Tested)    Collection Time: 12/03/24  4:40 AM   Result Value Ref Range    Holding Tube - Bb DONE    CBC with differential    Collection Time: 12/03/24  4:40 AM   Result Value Ref Range    WBC 11.5 (H) 4.8 - 10.8 K/uL    RBC 4.26 4.20 - 5.40 M/uL    Hemoglobin 12.5 12.0 - 16.0 g/dL    Hematocrit 38.1 37.0 - 47.0 %    MCV 89.4 81.4 - 97.8 fL    MCH 29.3 27.0 - 33.0 pg    MCHC 32.8 32.2 - 35.5 g/dL    RDW 46.3 35.9 - 50.0 fL    Platelet Count 166 164 - 446 K/uL    MPV 14.3 (H) 9.0 - 12.9 fL    Neutrophils-Polys 67.20 44.00 - 72.00 %    Lymphocytes 22.70 22.00 - 41.00 %    Monocytes 8.30 0.00 - 13.40 %    Eosinophils 0.90 0.00 - 6.90 %    Basophils 0.30 0.00 - 1.80 %    Immature Granulocytes 0.60 0.00 - 0.90 %    Nucleated RBC 0.00 0.00 - 0.20 /100 WBC    Neutrophils (Absolute) 7.70 (H) 1.82 - 7.42 K/uL    Lymphs (Absolute) 2.60 1.00 - 4.80 K/uL    Monos (Absolute) 0.95 (H) 0.00 - 0.85 K/uL    Eos (Absolute) 0.10 0.00 - 0.51 K/uL    Baso (Absolute) 0.04 0.00 - 0.12 K/uL    Immature Granulocytes (abs) 0.07 0.00 - 0.11 K/uL    NRBC (Absolute) 0.00 K/uL   T.PALLIDUM AB KIRA (Syphilis)    Collection Time: 12/03/24  " 4:40 AM   Result Value Ref Range    Syphilis, Treponemal Qual Non-Reactive Non-Reactive       Assessment:   37w2d/Gestational HTN without severe features on Labetalol-Pt s/p srom. Will start Pitocin per protocol. Exp .  GBBS negative  Fetal status-ressuring        Iza Crooks M.D.

## 2024-12-03 NOTE — H&P
DATE OF ADMISSION:  2024     ADMITTING DIAGNOSES:  1.  Intrauterine pregnancy at 37 weeks and 1 day.  2.  Gestational hypertension without severe features.  3.  Rh negative.  4.  Asthma.  5.  GBS negative.     HISTORY OF PRESENT ILLNESS:  This patient is a 34-year-old  4, para 0   at 37 weeks and 1 day with a due date of 2024 based on ultrasound at 6   weeks.  The patient has been having prenatal care with me and it has been   uncomplicated.  She went to labor and delivery on Thanksgiving day secondary   to not feeling well and having an elevated blood pressure at home of 140/80.    The patient was seen in labor and delivery and her blood pressures were found   to be labile, but her labs were normal.  She was started on labetalol 100 mg 1   p.o. b.i.d. and was told to follow up with me today.  The patient had another   elevated blood pressure of 152/94 and went to labor and delivery on .    She was monitored and sent home and again told to come back for follow up with   me today.  The patient denies any headaches or visual problems.  The patient   has been taking her blood pressure twice a day and they are labile, ranging   between 120 and 150/90.  At this time, the patient is term with gestational   hypertension, now on labetalol and therefore, I discussed with the patient the   need to proceed with induction of labor.  I discussed with the patient the   risks, benefits, indications, and alternatives to induction of labor and she   agrees.  The patient has been having good fetal movement.  She denies any   contractions, no leaking of fluid.     PAST MEDICAL HISTORY:  1.  Gestational hypertension without severe features.  2.  Asthma.  3.  Rh negative.  4.  Obesity.     PAST SURGICAL HISTORY:  [place] teeth removal in .     MEDICATIONS:  1.  She is on prenatal vitamins.  2.  Aspirin 81 mg 1 p.o. q. day.  3.  Labetalol 100 mg 1 p.o. b.i.d.     ALLERGIES:  BENADRYL.     OBSTETRICAL HISTORY:   She is a  4, para 0.  First pregnancy was a   spontaneous  at 10 weeks.  Second pregnancy was a spontaneous   .  Third pregnancy was another spontaneous  in the first   trimester.     GYNECOLOGIC HISTORY:  The patient started menstruating at age 13.  She has   menstrual cycles every 28 days and last 5 days.  Her last menstrual cycle was   on 2024.  Her last Pap smear was on 2024.  Negative Pap, negative   HPV, negative chlamydia, negative gonorrhea.     SOCIAL HISTORY:  The patient is .  She denies tobacco, alcohol or drug   use.     FAMILY HISTORY:  The patient's mother  when the patient was 33 weeks'   pregnant.     PHYSICAL EXAMINATION:  VITAL SIGNS:  Blood pressure 121/79, weight is 200 pounds.  Total weight gain   this pregnancy 30 pounds.  GENERAL:  Pleasant female in no acute distress.  LUNGS:  Clear to auscultation bilaterally.  CARDIOVASCULAR:  Regular rate and rhythm.  No murmur.  ABDOMEN:  Soft, gravid.  Leopold's 7 pounds.  EXTREMITIES:  No calf tenderness.     PRENATAL LABORATORY DATA:  Group B strep is negative, RPR nonreactive.  H and   H at 28 weeks 12.5 and 38.1, platelets 203. 1-hour glucose 93. MSAFP negative.   NIPT negative for chromosomes 13, 18 and 21 female.  Hepatitis B surface   antigen negative, hepatitis C nonreactive, RPR nonreactive, rubella nonimmune,   O negative, antibody screen negative, HIV nonreactive.     DIAGNOSTIC DATA:  [facility]   performed on 2024, IUP at 33 weeks and 3 days, 4 pounds 15 ounces,   vertex presentation, fetal growth appeared normal, regular fetal heart rate of   143 beats per minute, anterior placenta, no placenta previa.  RUBY 19.3.     ASSESSMENT AND PLAN:  A 34-year-old  4, para 0 at 37 weeks and 1 day.  1. Gestational hypertension without severe features.  The patient was   evaluated in labor and delivery on Thanksgiving day and was started on    labetalol 100 mg 1 p.o. b.i.d.  Despite taking labetalol, her blood pressures   have continued to be labile.  She had normal PIH labs at [facility] and has no PIH   symptoms. At this time, we are proceeding with induction of labor secondary   to being term. The patient is aware of the risks, benefits, indications and   alternatives and is ready to proceed.  The patient will be started on Cytotec   for cervical ripening and then Pitocin as needed.  Her cervix today 1 one cm   dilated, 50% effaced and -3 station.  2.  GBS negative.  3.  Rh negative, status post RhoGAM at 28 weeks.        ______________________________  MD FATMATA ARTHUR/MYRIAM    DD:  12/02/2024 21:21  DT:  12/02/2024 22:21    Job#:  [id number]

## 2024-12-03 NOTE — PROGRESS NOTES
0652: Received report from Iker RN, at bedside, plan of care discussed. Call light in reach when in need of assistance. Plan to recheck cervix around 0910 for possible cyto placement. Orders from Dr. Crooks for HTN protocol if needed.     0925: RN at bedside for SVE, unchanged from previous exam. Unable to place repeat cytotec per order due to pt having too many ctx for RN to place. Dr. Crooks notified, plan for MD to come to bedside around 1200 for SVE and possible placement of cyto.    1820: Report given to Ivy HERRON, plan of care discussed.

## 2024-12-03 NOTE — PROGRESS NOTES
"Labor Progress Note    Pamela Kerr     ADMITTING DIAGNOSES:  1.  Intrauterine pregnancy at 37 weeks and 2day.  2.  Gestational hypertension without severe features.  3.  Rh negative.  4.  Asthma.  5.  GBS negative.      Subjective:  Pt without PIH sx. Pt on Labetalol 100 mg one tab bid started at 36+ weeks for worsening blood pressures. Pt without pain.  Uterine contractions:yes  Pain: No    Objective:   Vitals:    12/03/24 0414 12/03/24 0415   BP: 129/75    Pulse: 90 94   Resp: 18    Temp: 36.2 °C (97.1 °F)    TempSrc: Temporal    SpO2: 97% 97%   Weight: 90.7 kg (200 lb)    Height: 1.575 m (5' 2\")      FHT: 120's cat 1  [place]: q 2-3  SVE: defer    Membranes ruptured: .no    Meds:   Epidural : .no  Pitocin: .no  S/p cytotec 25 mcg per vagina at 5:10 am    Labs:  Recent Results (from the past 24 hours)   Hold Blood Bank Specimen (Not Tested)    Collection Time: 12/03/24  4:40 AM   Result Value Ref Range    Holding Tube - Bb DONE    CBC with differential    Collection Time: 12/03/24  4:40 AM   Result Value Ref Range    WBC 11.5 (H) 4.8 - 10.8 K/uL    RBC 4.26 4.20 - 5.40 M/uL    Hemoglobin 12.5 12.0 - 16.0 g/dL    Hematocrit 38.1 37.0 - 47.0 %    MCV 89.4 81.4 - 97.8 fL    MCH 29.3 27.0 - 33.0 pg    MCHC 32.8 32.2 - 35.5 g/dL    RDW 46.3 35.9 - 50.0 fL    Platelet Count 166 164 - 446 K/uL    MPV 14.3 (H) 9.0 - 12.9 fL    Neutrophils-Polys 67.20 44.00 - 72.00 %    Lymphocytes 22.70 22.00 - 41.00 %    Monocytes 8.30 0.00 - 13.40 %    Eosinophils 0.90 0.00 - 6.90 %    Basophils 0.30 0.00 - 1.80 %    Immature Granulocytes 0.60 0.00 - 0.90 %    Nucleated RBC 0.00 0.00 - 0.20 /100 WBC    Neutrophils (Absolute) 7.70 (H) 1.82 - 7.42 K/uL    Lymphs (Absolute) 2.60 1.00 - 4.80 K/uL    Monos (Absolute) 0.95 (H) 0.00 - 0.85 K/uL    Eos (Absolute) 0.10 0.00 - 0.51 K/uL    Baso (Absolute) 0.04 0.00 - 0.12 K/uL    Immature Granulocytes (abs) 0.07 0.00 - 0.11 K/uL    NRBC (Absolute) 0.00 K/uL   T.PALLIDUM AB KIRA (Syphilis)    " Collection Time: 12/03/24  4:40 AM   Result Value Ref Range    Syphilis, Treponemal Qual Non-Reactive Non-Reactive      Latest Reference Range & Units 12/01/24 15:50 12/03/24 04:40   WBC 4.8 - 10.8 K/uL 12.4 (H) 11.5 (H)   RBC 4.20 - 5.40 M/uL 4.21 4.26   Hemoglobin 12.0 - 16.0 g/dL 12.7 12.5   Hematocrit 37.0 - 47.0 % 37.7 38.1   MCV 81.4 - 97.8 fL 89.5 89.4   MCH 27.0 - 33.0 pg 30.2 29.3   MCHC 32.2 - 35.5 g/dL 33.7 32.8   RDW 35.9 - 50.0 fL 46.4 46.3   Platelet Count 164 - 446 K/uL 155 (L) 166   MPV 9.0 - 12.9 fL 14.2 (H) 14.3 (H)   (H): Data is abnormally high  (L): Data is abnormally low   Latest Reference Range & Units 12/01/24 15:15 12/01/24 15:50   Sodium 135 - 145 mmol/L  137   Potassium 3.6 - 5.5 mmol/L  3.7   Chloride 96 - 112 mmol/L  104   Co2 20 - 33 mmol/L  18 (L)   Anion Gap 7.0 - 16.0   15.0   Glucose 65 - 99 mg/dL  124 (H)   Bun 8 - 22 mg/dL  8   Creatinine 0.50 - 1.40 mg/dL  0.57   GFR (CKD-EPI) >60 mL/min/1.73 m 2  123   Calcium 8.5 - 10.5 mg/dL  9.7   Correct Calcium 8.5 - 10.5 mg/dL  9.9   AST(SGOT) 12 - 45 U/L  17   ALT(SGPT) 2 - 50 U/L  17   Alkaline Phosphatase 30 - 99 U/L  162 (H)   Total Bilirubin 0.1 - 1.5 mg/dL  0.2   Albumin 3.2 - 4.9 g/dL  3.7   Total Protein 6.0 - 8.2 g/dL  6.6   Globulin 1.9 - 3.5 g/dL  2.9   A-G Ratio g/dL  1.3   Creatinine, Random Urine mg/dL 63.22    Total Protein, Urine 0.0 - 15.0 mg/dL 7.0    Protein Creatinine Ratio 10 - 107 mg/g 111 (H)    (L): Data is abnormally low  (H): Data is abnormally high      Assessment:   37w2d/gestational HTN without severe features-pt here for IOL. CPM. Consider a 2nd cytotec at 900 am.  Pt with stable blood pressures  GBBS negative  Fetal status: reassuring  RH negative      Iza Crooks M.D.

## 2024-12-03 NOTE — H&P
DATE OF ADMISSION:  2024     ADMITTING DIAGNOSES:  1.  Intrauterine pregnancy at 37 weeks and 1 day.  2.  Gestational hypertension without severe features.  3.  Rh negative.  4.  Asthma.  5.  GBS negative.     HISTORY OF PRESENT ILLNESS:  This patient is a 34-year-old  4, para 0   at 37 weeks and 1 day with a due date of 2024 based on ultrasound at 6   weeks.  The patient has been having prenatal care with me and it has been   uncomplicated.  She went to labor and delivery on Thanksgiving day secondary   to not feeling well and having an elevated blood pressure at home of 140/80.    The patient was seen in labor and delivery and her blood pressures were found   to be labile, but her labs were normal.  She was started on labetalol 100 mg 1   p.o. b.i.d. and was told to follow up with me today.  The patient had another   elevated blood pressure of 152/94 and went to labor and delivery on .    She was monitored and sent home and again told to come back for follow up with   me today.  The patient denies any headaches or visual problems.  The patient   has been taking her blood pressure twice a day and they are labile, ranging   between 120 and 150/90.  At this time, the patient is term with gestational   hypertension, now on labetalol and therefore, I discussed with the patient the   need to proceed with induction of labor.  I discussed with the patient the   risks, benefits, indications, and alternatives to induction of labor and she   agrees.  The patient has been having good fetal movement.  She denies any   contractions, no leaking of fluid.     PAST MEDICAL HISTORY:  1.  Gestational hypertension without severe features.  2.  Asthma.  3.  Rh negative.  4.  Obesity.     PAST SURGICAL HISTORY:  [place] teeth removal in .     MEDICATIONS:  1.  She is on prenatal vitamins.  2.  Aspirin 81 mg 1 p.o. q. day.  3.  Labetalol 100 mg 1 p.o. b.i.d.     ALLERGIES:  BENADRYL.     OBSTETRICAL HISTORY:   She is a  4, para 0.  First pregnancy was a   spontaneous  at 10 weeks.  Second pregnancy was a spontaneous   .  Third pregnancy was another spontaneous  in the first   trimester.     GYNECOLOGIC HISTORY:  The patient started menstruating at age 13.  She has   menstrual cycles every 28 days and last 5 days.  Her last menstrual cycle was   on 2024.  Her last Pap smear was on 2024.  Negative Pap, negative   HPV, negative chlamydia, negative gonorrhea.     SOCIAL HISTORY:  The patient is .  She denies tobacco, alcohol or drug   use.     FAMILY HISTORY:  The patient's mother  when the patient was 33 weeks'   pregnant.     PHYSICAL EXAMINATION:  VITAL SIGNS:  Blood pressure 121/79, weight is 200 pounds.  Total weight gain   this pregnancy 30 pounds.  GENERAL:  Pleasant female in no acute distress.  LUNGS:  Clear to auscultation bilaterally.  CARDIOVASCULAR:  Regular rate and rhythm.  No murmur.  ABDOMEN:  Soft, gravid.  Leopold's 7 pounds.  EXTREMITIES:  No calf tenderness.     PRENATAL LABORATORY DATA:  Group B strep is negative, RPR nonreactive.  H and   H at 28 weeks 12.5 and 38.1, platelets 203. 1-hour glucose 93. MSAFP negative.   NIPT negative for chromosomes 13, 18 and 21 female.  Hepatitis B surface   antigen negative, hepatitis C nonreactive, RPR nonreactive, rubella nonimmune,   O negative, antibody screen negative, HIV nonreactive.     DIAGNOSTIC DATA:  Most [facility]   performed on 2024, IUP at 33 weeks and 3 days, 4 pounds 15 ounces,   vertex presentation, fetal growth appeared normal, regular fetal heart rate of   143 beats per minute, anterior placenta, no placenta previa.  RUBY 19.3.     ASSESSMENT AND PLAN:  A 34-year-old  4, para 0 at 37 weeks and 1 day.  1. Gestational hypertension without severe features.  The patient was   evaluated in labor and delivery on Thanksgiving day and was started on    labetalol 100 mg 1 p.o. b.i.d.  Despite taking labetalol, her blood pressures   have continued to be labile.  She had normal PIH labs at [facility] and has no PIH   symptoms. At this time, we are proceeding with induction of labor secondary   to being term. The patient is aware of the risks, benefits, indications and   alternatives and is ready to proceed.  The patient will be started on Cytotec   for cervical ripening and then Pitocin as needed.  Her cervix today 1 one cm   dilated, 50% effaced and -3 station.  2.  GBS negative.  3.  Rh negative, status post RhoGAM at 28 weeks.        ______________________________  MD FATMATA ARTUHR/MYRIAM    DD:  12/02/2024 21:21  DT:  12/02/2024 22:21    Job#:  [id number]

## 2024-12-03 NOTE — PROGRESS NOTES
0411: Patient is a  who presents to L&D for IOL due to gestational HTN. Patient endorses +FM and occasional contractions. Denies LOF and VB. VSS. EFM and TOCO applied. Admit profile asked and answered. Patient oriented to room and call light within reach. IV started and labs drawn (see LDA avatar). SVE performed (see flowsheets).     0510: Cytotec placed by Shilpa HERRON at this time.     0652: Bedside report given to Juanita HERRON. POC discussed. Care relinquished at this time.

## 2024-12-04 LAB
BASOPHILS # BLD AUTO: 0 % (ref 0–1.8)
BASOPHILS # BLD: 0 K/UL (ref 0–0.12)
EKG IMPRESSION: NORMAL
EOSINOPHIL # BLD AUTO: 0 K/UL (ref 0–0.51)
EOSINOPHIL NFR BLD: 0 % (ref 0–6.9)
ERYTHROCYTE [DISTWIDTH] IN BLOOD BY AUTOMATED COUNT: 47.9 FL (ref 35.9–50)
HCT VFR BLD AUTO: 31.2 % (ref 37–47)
HGB BLD-MCNC: 10.4 G/DL (ref 12–16)
LYMPHOCYTES # BLD AUTO: 1.66 K/UL (ref 1–4.8)
LYMPHOCYTES NFR BLD: 4.3 % (ref 22–41)
MANUAL DIFF BLD: NORMAL
MCH RBC QN AUTO: 29.9 PG (ref 27–33)
MCHC RBC AUTO-ENTMCNC: 33.3 G/DL (ref 32.2–35.5)
MCV RBC AUTO: 89.7 FL (ref 81.4–97.8)
MICROCYTES BLD QL SMEAR: ABNORMAL
MONOCYTES # BLD AUTO: 2.7 K/UL (ref 0–0.85)
MONOCYTES NFR BLD AUTO: 7 % (ref 0–13.4)
MORPHOLOGY BLD-IMP: NORMAL
NEUTROPHILS # BLD AUTO: 34.24 K/UL (ref 1.82–7.42)
NEUTROPHILS NFR BLD: 85.2 % (ref 44–72)
NEUTS BAND NFR BLD MANUAL: 3.5 % (ref 0–10)
NRBC # BLD AUTO: 0 K/UL
NRBC BLD-RTO: 0 /100 WBC (ref 0–0.2)
PLATELET # BLD AUTO: 182 K/UL (ref 164–446)
PLATELET BLD QL SMEAR: NORMAL
PMV BLD AUTO: 14.2 FL (ref 9–12.9)
RBC # BLD AUTO: 3.48 M/UL (ref 4.2–5.4)
RBC BLD AUTO: PRESENT
RH BLD: NORMAL
T4 FREE SERPL-MCNC: 0.72 NG/DL (ref 0.93–1.7)
TSH SERPL-ACNC: 0.57 UIU/ML (ref 0.35–5.5)
WBC # BLD AUTO: 38.6 K/UL (ref 4.8–10.8)

## 2024-12-04 PROCEDURE — 85007 BL SMEAR W/DIFF WBC COUNT: CPT

## 2024-12-04 PROCEDURE — 84443 ASSAY THYROID STIM HORMONE: CPT

## 2024-12-04 PROCEDURE — 700105 HCHG RX REV CODE 258: Performed by: OBSTETRICS & GYNECOLOGY

## 2024-12-04 PROCEDURE — 3E033VJ INTRODUCTION OF OTHER HORMONE INTO PERIPHERAL VEIN, PERCUTANEOUS APPROACH: ICD-10-PCS | Performed by: OBSTETRICS & GYNECOLOGY

## 2024-12-04 PROCEDURE — 93010 ELECTROCARDIOGRAM REPORT: CPT | Performed by: INTERNAL MEDICINE

## 2024-12-04 PROCEDURE — 0KQM0ZZ REPAIR PERINEUM MUSCLE, OPEN APPROACH: ICD-10-PCS | Performed by: OBSTETRICS & GYNECOLOGY

## 2024-12-04 PROCEDURE — 84439 ASSAY OF FREE THYROXINE: CPT

## 2024-12-04 PROCEDURE — 59409 OBSTETRICAL CARE: CPT

## 2024-12-04 PROCEDURE — 700102 HCHG RX REV CODE 250 W/ 637 OVERRIDE(OP): Performed by: OBSTETRICS & GYNECOLOGY

## 2024-12-04 PROCEDURE — 304965 HCHG RECOVERY SERVICES

## 2024-12-04 PROCEDURE — 700111 HCHG RX REV CODE 636 W/ 250 OVERRIDE (IP): Mod: JZ | Performed by: ANESTHESIOLOGY

## 2024-12-04 PROCEDURE — 93005 ELECTROCARDIOGRAM TRACING: CPT | Mod: TC | Performed by: OBSTETRICS & GYNECOLOGY

## 2024-12-04 PROCEDURE — 700111 HCHG RX REV CODE 636 W/ 250 OVERRIDE (IP): Mod: JZ | Performed by: OBSTETRICS & GYNECOLOGY

## 2024-12-04 PROCEDURE — 770002 HCHG ROOM/CARE - OB PRIVATE (112)

## 2024-12-04 PROCEDURE — 85027 COMPLETE CBC AUTOMATED: CPT

## 2024-12-04 PROCEDURE — A9270 NON-COVERED ITEM OR SERVICE: HCPCS | Performed by: OBSTETRICS & GYNECOLOGY

## 2024-12-04 PROCEDURE — 3E0DXGC INTRODUCTION OF OTHER THERAPEUTIC SUBSTANCE INTO MOUTH AND PHARYNX, EXTERNAL APPROACH: ICD-10-PCS | Performed by: OBSTETRICS & GYNECOLOGY

## 2024-12-04 PROCEDURE — 36415 COLL VENOUS BLD VENIPUNCTURE: CPT

## 2024-12-04 RX ORDER — METOCLOPRAMIDE HYDROCHLORIDE 5 MG/ML
10 INJECTION INTRAMUSCULAR; INTRAVENOUS ONCE
Status: DISCONTINUED | OUTPATIENT
Start: 2024-12-04 | End: 2024-12-04

## 2024-12-04 RX ORDER — MISOPROSTOL 200 UG/1
600 TABLET ORAL
Status: DISCONTINUED | OUTPATIENT
Start: 2024-12-04 | End: 2024-12-05 | Stop reason: HOSPADM

## 2024-12-04 RX ORDER — CARBOPROST TROMETHAMINE 250 UG/ML
250 INJECTION, SOLUTION INTRAMUSCULAR
Status: DISCONTINUED | OUTPATIENT
Start: 2024-12-04 | End: 2024-12-05 | Stop reason: HOSPADM

## 2024-12-04 RX ORDER — CITRIC ACID/SODIUM CITRATE 334-500MG
30 SOLUTION, ORAL ORAL ONCE
Status: DISCONTINUED | OUTPATIENT
Start: 2024-12-04 | End: 2024-12-04

## 2024-12-04 RX ORDER — SODIUM CHLORIDE, SODIUM GLUCONATE, SODIUM ACETATE, POTASSIUM CHLORIDE AND MAGNESIUM CHLORIDE 526; 502; 368; 37; 30 MG/100ML; MG/100ML; MG/100ML; MG/100ML; MG/100ML
INJECTION, SOLUTION INTRAVENOUS ONCE
Status: DISCONTINUED | OUTPATIENT
Start: 2024-12-04 | End: 2024-12-04

## 2024-12-04 RX ORDER — ACETAMINOPHEN 500 MG
1000 TABLET ORAL EVERY 6 HOURS PRN
Status: DISCONTINUED | OUTPATIENT
Start: 2024-12-04 | End: 2024-12-05 | Stop reason: HOSPADM

## 2024-12-04 RX ORDER — DOCUSATE SODIUM 100 MG/1
100 CAPSULE, LIQUID FILLED ORAL 2 TIMES DAILY PRN
Status: DISCONTINUED | OUTPATIENT
Start: 2024-12-04 | End: 2024-12-05 | Stop reason: HOSPADM

## 2024-12-04 RX ORDER — SODIUM CHLORIDE, SODIUM LACTATE, POTASSIUM CHLORIDE, CALCIUM CHLORIDE 600; 310; 30; 20 MG/100ML; MG/100ML; MG/100ML; MG/100ML
2000 INJECTION, SOLUTION INTRAVENOUS PRN
Status: DISCONTINUED | OUTPATIENT
Start: 2024-12-04 | End: 2024-12-05 | Stop reason: HOSPADM

## 2024-12-04 RX ORDER — SODIUM CHLORIDE, SODIUM LACTATE, POTASSIUM CHLORIDE, AND CALCIUM CHLORIDE .6; .31; .03; .02 G/100ML; G/100ML; G/100ML; G/100ML
1000 INJECTION, SOLUTION INTRAVENOUS ONCE
Status: DISCONTINUED | OUTPATIENT
Start: 2024-12-04 | End: 2024-12-04

## 2024-12-04 RX ORDER — SODIUM CHLORIDE, SODIUM LACTATE, POTASSIUM CHLORIDE, CALCIUM CHLORIDE 600; 310; 30; 20 MG/100ML; MG/100ML; MG/100ML; MG/100ML
INJECTION, SOLUTION INTRAVENOUS CONTINUOUS
Status: ACTIVE | OUTPATIENT
Start: 2024-12-04 | End: 2024-12-04

## 2024-12-04 RX ORDER — IBUPROFEN 800 MG/1
800 TABLET, FILM COATED ORAL EVERY 8 HOURS PRN
Status: DISCONTINUED | OUTPATIENT
Start: 2024-12-04 | End: 2024-12-05 | Stop reason: HOSPADM

## 2024-12-04 RX ADMIN — ACETAMINOPHEN 1000 MG: 500 TABLET, FILM COATED ORAL at 23:16

## 2024-12-04 RX ADMIN — ACETAMINOPHEN 1000 MG: 500 TABLET ORAL at 10:55

## 2024-12-04 RX ADMIN — IBUPROFEN 800 MG: 800 TABLET, FILM COATED ORAL at 21:25

## 2024-12-04 RX ADMIN — IBUPROFEN 800 MG: 800 TABLET, FILM COATED ORAL at 08:57

## 2024-12-04 RX ADMIN — OXYTOCIN 999 ML/HR: 10 INJECTION, SOLUTION INTRAMUSCULAR; INTRAVENOUS at 08:51

## 2024-12-04 RX ADMIN — ROPIVACAINE HYDROCHLORIDE: 2 INJECTION, SOLUTION EPIDURAL; INFILTRATION at 00:30

## 2024-12-04 ASSESSMENT — PAIN DESCRIPTION - PAIN TYPE
TYPE: ACUTE PAIN

## 2024-12-04 ASSESSMENT — PAIN SCALES - GENERAL: PAIN_LEVEL: 0

## 2024-12-04 NOTE — PROGRESS NOTES
Post Partum Progress Note    Name:   Pamela Kerr   Date/Time:  12/4/2024 - 1:14 PM  Chief Admitting Dx:  Labor and delivery indication for care or intervention [O75.9]  Delivery Type:  vaginal, spontaneous  Post-Op/Post Partum Days #:  0, 5 hours ago     Called secondary to maternal tachycardia and pt feeling light headed when she attempted to ambulate.    Subjective:  Pt with abdominal pain 4/10 in severity. Pt with minimal lochia. Pt without a fever. Pt had breakfast without nausea and vomiting    Vitals:    12/04/24 1115 12/04/24 1130 12/04/24 1145 12/04/24 1150   BP:    126/69   Pulse: (!) 140 (!) 143 (!) 136 (!) 123   Resp:       Temp:       TempSrc:       SpO2: 97% 97% 97%    Weight:       Height:         TC: 98.2   Exam:  Gen: NAD, lying in bed.  Palacios placed,  ml, concentrated (5 hours)  Abd: soft, firm fundus below umbilicus, mild tenderness  : sutures in place, no hematoma noted.  Ext: 1+ edema    Meds:  Current Facility-Administered Medications   Medication Dose    lidocaine (XYLOCAINE) 1%  injection  20 mL    terbutaline (Brethine) injection 0.25 mg  0.25 mg    oxytocin (Pitocin) injection 10 Units  10 Units    oxytocin (Pitocin) infusion (for post delivery)  125 mL/hr    fentaNYL (Sublimaze) injection 50 mcg  50 mcg    Or    fentaNYL (Sublimaze) injection 100 mcg  100 mcg    ondansetron (Zofran ODT) dispertab 4 mg  4 mg    Or    ondansetron (Zofran) syringe/vial injection 4 mg  4 mg    sodium phosphate enema 1 Each  1 Each    mag hydrox-al hydrox-simeth (Maalox Plus Es Or Mylanta Ds) suspension 30 mL  30 mL    carboPROST (Hemabate) injection 250 mcg  250 mcg    miSOPROStol (Cytotec) tablet 800 mcg  800 mcg    Tranexamic Acid (Cyklokapron) 1,000 mg in  mL IVPB  1,000 mg    hydrALAZINE (Apresoline) injection 5-10 mg  5-10 mg    Or    labetalol (Normodyne/Trandate) injection 20-80 mg  20-80 mg    Or    NIFEdipine IR (Procardia) capsule 10 mg  10 mg    miSOPROStol (Cytotec) tablet 25  mcg  25 mcg    oxytocin (Pitocin) 0.02 Units/mL  0-20 juan j-units/min       Labs:   Recent Labs     12/01/24  1550 12/03/24  0440   WBC 12.4* 11.5*   RBC 4.21 4.26   HEMOGLOBIN 12.7 12.5   HEMATOCRIT 37.7 38.1   MCV 89.5 89.4   MCH 30.2 29.3   MCHC 33.7 32.8   RDW 46.4 46.3   PLATELETCT 155* 166   MPV 14.2* 14.3*     Pending CBC and TSH/free T4    Assessment/Plan:  3-Isdilmdsezb-BOH and TSH pending. Pt afebrile and no evidence of endometritis. Pt had SROM for 23 hours. Will continue to monitor her temperature. Pt with minimal lochia. Pt with low urine output. Will give 1 liter LR bolus. Tachycardia possibly secondary to dehydration.  Will continue with sotomayor until tomorrow am. Keep in L & D for now until labs are back. Encourage PO hydration.  2-Gestational hypertension without severe features-Pt was on Labetalol 100 mg one tab bid for 6 days. Pt is now off Labetalol and pt with normal blood pressures without PIH sx.       Iza Crooks M.D.

## 2024-12-04 NOTE — PROGRESS NOTES
Labor Progress Note    Pamela Kerr   37w2d/Gestational hypertension without severe features          Subjective:  Pt comfortable with epidural.  Uterine contractions:yes  Pain: No    Objective:   Vitals:    12/03/24 1755 12/03/24 1814 12/03/24 1835 12/03/24 1854   BP: 123/65 118/66 110/59 117/57   Pulse: 92 93 92 90   Resp:       Temp:   36.6 °C (97.8 °F)    TempSrc:   Temporal    SpO2:       Weight:       Height:         FHT: 150's cat 1  [place]: q 2  SVE: 3/90/-1    Membranes ruptured: .yes    Meds:   Epidural : .yes  Pitocin: .yes, 10 mu    Labs:  Recent Results (from the past 24 hours)   Hold Blood Bank Specimen (Not Tested)    Collection Time: 12/03/24  4:40 AM   Result Value Ref Range    Holding Tube - Bb DONE    CBC with differential    Collection Time: 12/03/24  4:40 AM   Result Value Ref Range    WBC 11.5 (H) 4.8 - 10.8 K/uL    RBC 4.26 4.20 - 5.40 M/uL    Hemoglobin 12.5 12.0 - 16.0 g/dL    Hematocrit 38.1 37.0 - 47.0 %    MCV 89.4 81.4 - 97.8 fL    MCH 29.3 27.0 - 33.0 pg    MCHC 32.8 32.2 - 35.5 g/dL    RDW 46.3 35.9 - 50.0 fL    Platelet Count 166 164 - 446 K/uL    MPV 14.3 (H) 9.0 - 12.9 fL    Neutrophils-Polys 67.20 44.00 - 72.00 %    Lymphocytes 22.70 22.00 - 41.00 %    Monocytes 8.30 0.00 - 13.40 %    Eosinophils 0.90 0.00 - 6.90 %    Basophils 0.30 0.00 - 1.80 %    Immature Granulocytes 0.60 0.00 - 0.90 %    Nucleated RBC 0.00 0.00 - 0.20 /100 WBC    Neutrophils (Absolute) 7.70 (H) 1.82 - 7.42 K/uL    Lymphs (Absolute) 2.60 1.00 - 4.80 K/uL    Monos (Absolute) 0.95 (H) 0.00 - 0.85 K/uL    Eos (Absolute) 0.10 0.00 - 0.51 K/uL    Baso (Absolute) 0.04 0.00 - 0.12 K/uL    Immature Granulocytes (abs) 0.07 0.00 - 0.11 K/uL    NRBC (Absolute) 0.00 K/uL   T.PALLIDUM AB KIRA (Syphilis)    Collection Time: 12/03/24  4:40 AM   Result Value Ref Range    Syphilis, Treponemal Qual Non-Reactive Non-Reactive       Assessment:   37w2d/early labor-CPM with Pitocin.   Gestational HTN without severe features-No  ORION julian. CPM.  Fetal status-reassuring        Iza Crooks M.D.

## 2024-12-04 NOTE — ANESTHESIA PREPROCEDURE EVALUATION
Date: 12/03/24  Procedure: Labor Epidural         Relevant Problems   No relevant active problems       Physical Exam    Airway   Mallampati: II  TM distance: >3 FB  Neck ROM: full       Cardiovascular - normal exam  Rhythm: regular  Rate: normal  (-) murmur     Dental - normal exam           Pulmonary - normal exam  Breath sounds clear to auscultation     Abdominal    Neurological - normal exam                   Anesthesia Plan    ASA 2       Plan - epidural                     Pertinent diagnostic labs and testing reviewed    Informed Consent:    Anesthetic plan and risks discussed with patient.

## 2024-12-04 NOTE — PROGRESS NOTES
Labor Progress Note    Pamela Kerr   37w2d/Gestational hypertension without severe features       Subjective:  Pt comfortable with epidural.  Uterine contractions:yes  Pain: No    Objective:   Vitals:    12/03/24 0415 12/03/24 0653 12/03/24 0654 12/03/24 1202   BP:   129/73 139/81   Pulse: 94 96 85 92   Resp:   17 18   Temp:   36.2 °C (97.2 °F) 36.3 °C (97.3 °F)   TempSrc:   Temporal Temporal   SpO2: 97% 98% 98%    Weight:       Height:         FHT: 150's cat 1  [place]: q 1-2  SVE:3/90/-1, vertex     Membranes ruptured: .yes    Meds:   Epidural : .yes  Pitocin: .yes    Labs:  Recent Results (from the past 24 hours)   Hold Blood Bank Specimen (Not Tested)    Collection Time: 12/03/24  4:40 AM   Result Value Ref Range    Holding Tube - Bb DONE    CBC with differential    Collection Time: 12/03/24  4:40 AM   Result Value Ref Range    WBC 11.5 (H) 4.8 - 10.8 K/uL    RBC 4.26 4.20 - 5.40 M/uL    Hemoglobin 12.5 12.0 - 16.0 g/dL    Hematocrit 38.1 37.0 - 47.0 %    MCV 89.4 81.4 - 97.8 fL    MCH 29.3 27.0 - 33.0 pg    MCHC 32.8 32.2 - 35.5 g/dL    RDW 46.3 35.9 - 50.0 fL    Platelet Count 166 164 - 446 K/uL    MPV 14.3 (H) 9.0 - 12.9 fL    Neutrophils-Polys 67.20 44.00 - 72.00 %    Lymphocytes 22.70 22.00 - 41.00 %    Monocytes 8.30 0.00 - 13.40 %    Eosinophils 0.90 0.00 - 6.90 %    Basophils 0.30 0.00 - 1.80 %    Immature Granulocytes 0.60 0.00 - 0.90 %    Nucleated RBC 0.00 0.00 - 0.20 /100 WBC    Neutrophils (Absolute) 7.70 (H) 1.82 - 7.42 K/uL    Lymphs (Absolute) 2.60 1.00 - 4.80 K/uL    Monos (Absolute) 0.95 (H) 0.00 - 0.85 K/uL    Eos (Absolute) 0.10 0.00 - 0.51 K/uL    Baso (Absolute) 0.04 0.00 - 0.12 K/uL    Immature Granulocytes (abs) 0.07 0.00 - 0.11 K/uL    NRBC (Absolute) 0.00 K/uL   T.PALLIDUM AB KIRA (Syphilis)    Collection Time: 12/03/24  4:40 AM   Result Value Ref Range    Syphilis, Treponemal Qual Non-Reactive Non-Reactive       Assessment:   37w2d/Gestational hypertension without severe features  -pt progressing. CPM. Blood pressures stable on Labetalol 100 mg one tab bid. CPM. Exp .  GBBS neg        Iza Crooks M.D.

## 2024-12-04 NOTE — ANESTHESIA PROCEDURE NOTES
Epidural Block    Date/Time: 12/3/2024 4:34 PM    Performed by: Will Campo M.D.  Authorized by: Will Campo M.D.    Patient Location:  OB  Start Time:  12/3/2024 4:34 PM  Reason for Block: labor analgesia    patient identified, IV checked, site marked, risks and benefits discussed, surgical consent, monitors and equipment checked and pre-op evaluation    Patient Position:  Sitting  Prep: ChloraPrep, patient draped and sterile technique    Monitoring:  Blood pressure, continuous pulse oximetry and heart rate  Approach:  Midline  Location:  L3-L4  Injection Technique:  MARLYN air  Skin infiltration:  Lidocaine  Strength:  1%  Dose:  3ml  Needle Type:  Tuohy  Needle Gauge:  17 G  Needle Length:  3.5 in  Loss of resistance::  5  Catheter Size:  19 G  Catheter at Skin Depth:  15  Test Dose Result:  Negative

## 2024-12-04 NOTE — PROGRESS NOTES
1820- Report received from Juanita BLANCO discussed  0700-Report given to Carol BLANCO discussed

## 2024-12-04 NOTE — L&D DELIVERY NOTE
DATE OF SERVICE:  2024     DELIVERY NOTE     ADMITTING DIAGNOSES:  1.  Intrauterine pregnancy at 37 weeks and 1 day.  2.  Gestational hypertension without severe features.  3.  Rh negative.  4.  Asthma.  5.  GBS negative.     PROCEDURES:  1.  Induction of labor with Cytotec 25 mcg per vagina x1 followed by Pitocin   up to 8 milliunits.  2.  Epidural.  3.  Normal spontaneous vaginal delivery of a vigorous female with Apgars 8 and   9.     POSTPARTUM DIAGNOSIS: Prolonged rupture of membranes for 22 hours.     DELIVERY:  This patient is a 34-year-old  4, para 0 that was admitted   at 37 weeks and 1 day for induction of labor based on gestational hypertension   without severe features.  The patient's prenatal care was normal until   Thanksgiving day when she presented to labor and delivery with blood pressures   are 140/80s and 152/90s.  Her PIH labs were normal.  She was started on   labetalol 100 mg 1 p.o. b.i.d. since the patient was term, she was brought in   on  for induction of labor.  When she arrived, the fetal status was   reactive with a category 1 fetal heart tracing.  She was GBS negative;   therefore, no antibiotics were given.  She was continued on labetalol 100 mg 1   p.o. b.i.d.  At that time, she was 1 cm dilated, 50% effaced, -3 station and   therefore Cytotec was placed 25 mcg per vagina and she progressed without any   problems.  She had spontaneous rupture of membranes at 10:50 a.m., which was   clear and then was started on Pitocin.  She progressed through labor without   any problems.  She got up to 8 milliunits of Pitocin.  She was then having   tachysystole and then the Pitocin was decreased to 4 milliunits.  She was   complete at 5:45 a.m. on .  Again fetal status was category 1-2 with now   fetal tachycardia.  The patient remained afebrile.  At 8:15 in the morning,   she was prepped and draped in the usual sterile fashion.  I then assisted with   a normal spontaneous  vaginal delivery of a vigorous female in ALANA position.    The head was delivered atraumatically and the rest of the infant delivered   without any problems.  Mouth and nose were bulb suctioned.  Infant placed on   maternal abdomen.  Delayed cord clamping was performed for 7 minutes and then   the cord was doubly clamped and cut by the infant's father.  The placenta was   then delivered intact at 8:24 a.m.  Three-vessel cord was noted.  Uterus is   firm and hemostatic with an EBL of 300.  The patient did have a second-degree   midline laceration that was repaired with a 2-0 Vicryl on a CT1 needle without   any problems.  The patient tolerated the procedure well. [place] baby girl   and mom are doing well.  Infant's weight is pending.  Again, the patient was   ruptured for 22 hours, but was afebrile and her GBS was negative; therefore,   no antibiotics were given.        ______________________________  MD FATMATA ARTHUR/AZM    DD:  2024 08:38  DT:  2024 08:49    Job#:  [id number]

## 2024-12-04 NOTE — PROGRESS NOTES
1215 - Report received from Carol HERRON    1300 - Valeriy SUTTON at bedside, order received for 1L bolus of LR and for sotomayor catheter placement.    1357 - Update given to Valeriy SUTTON on pt lab results and status, orders received for IV fluids 200mL/hr for 1L, Q1 hour Bps and temps, and AM repeat CBC unless temp is above 100.4 F. Will give update at 1700.    1545 - Update given to Valeriy SUTTON, order received to d/c sotomayor catheter and IV fluids prior to transferring to .    1730 - Update given to Lala SUTTON, no new orders received.     1855 - Report given to Ivy HERRON, POC discussed, care transferred.

## 2024-12-04 NOTE — PROGRESS NOTES
Labor Progress Note    Pamela Kerr   37w3d      Subjective:  Pt comfortable with epidural.  Uterine contractions:yes  Pain: No    Objective:   Vitals:    24 2130 24 2200 24 2230 24 2300   BP: 120/61 120/74 114/59 105/53   Pulse: 98 (!) 103 96 (!) 105   Resp:       Temp:       TempSrc:       SpO2:       Weight:       Height:         FHT: 150's cat 1  [place]: q 2-3  SVE:6/c/0  Membranes ruptured: .yes    Meds:   Epidural : .yes  Pitocin: .yes, 8 mu    Labs:  Recent Results (from the past 24 hours)   Hold Blood Bank Specimen (Not Tested)    Collection Time: 24  4:40 AM   Result Value Ref Range    Holding Tube - Bb DONE    CBC with differential    Collection Time: 24  4:40 AM   Result Value Ref Range    WBC 11.5 (H) 4.8 - 10.8 K/uL    RBC 4.26 4.20 - 5.40 M/uL    Hemoglobin 12.5 12.0 - 16.0 g/dL    Hematocrit 38.1 37.0 - 47.0 %    MCV 89.4 81.4 - 97.8 fL    MCH 29.3 27.0 - 33.0 pg    MCHC 32.8 32.2 - 35.5 g/dL    RDW 46.3 35.9 - 50.0 fL    Platelet Count 166 164 - 446 K/uL    MPV 14.3 (H) 9.0 - 12.9 fL    Neutrophils-Polys 67.20 44.00 - 72.00 %    Lymphocytes 22.70 22.00 - 41.00 %    Monocytes 8.30 0.00 - 13.40 %    Eosinophils 0.90 0.00 - 6.90 %    Basophils 0.30 0.00 - 1.80 %    Immature Granulocytes 0.60 0.00 - 0.90 %    Nucleated RBC 0.00 0.00 - 0.20 /100 WBC    Neutrophils (Absolute) 7.70 (H) 1.82 - 7.42 K/uL    Lymphs (Absolute) 2.60 1.00 - 4.80 K/uL    Monos (Absolute) 0.95 (H) 0.00 - 0.85 K/uL    Eos (Absolute) 0.10 0.00 - 0.51 K/uL    Baso (Absolute) 0.04 0.00 - 0.12 K/uL    Immature Granulocytes (abs) 0.07 0.00 - 0.11 K/uL    NRBC (Absolute) 0.00 K/uL   T.PALLIDUM AB KIRA (Syphilis)    Collection Time: 24  4:40 AM   Result Value Ref Range    Syphilis, Treponemal Qual Non-Reactive Non-Reactive       Assessment:   37w3d/active labor-pt progressing. CPM. Exp .  Gestational HTN without severe features-Pt without PIH sx. CPM.  GBBS negative    Iza Crooks,  M.D.

## 2024-12-04 NOTE — ANESTHESIA POSTPROCEDURE EVALUATION
Patient: Pamela Kerr    Procedure Summary       Date: 12/03/24 Room / Location:     Anesthesia Start: 1629 Anesthesia Stop: 12/04/24 0815    Procedure: Labor Epidural Diagnosis:     Scheduled Providers:  Responsible Provider: Emiliano Harper M.D.    Anesthesia Type: epidural ASA Status: 2            Final Anesthesia Type: epidural  Last vitals  BP   Blood Pressure: 131/63    Temp   36.3 °C (97.4 °F)    Pulse   (!) 150   Resp   18    SpO2   98 %      Anesthesia Post Evaluation    Patient location during evaluation: floor  Patient participation: complete - patient participated  Level of consciousness: awake and alert  Pain score: 0    Airway patency: patent  Anesthetic complications: no  Cardiovascular status: hemodynamically stable  Respiratory status: acceptable  Hydration status: euvolemic    PONV: none          No notable events documented.

## 2024-12-04 NOTE — CARE PLAN
The patient is Stable - Low risk of patient condition declining or worsening    Shift Goals  Clinical Goals: Delivery of healthy baby  Patient Goals: Healthy mom and baby  Family Goals: Support    Progress made toward(s) clinical / shift goals:    Problem: Knowledge Deficit - L&D  Goal: Patient and family/caregivers will demonstrate understanding of plan of care, disease process/condition, diagnostic tests and medications  Outcome: Progressing   Pt continually updated and educated on ongoing POC  Problem: Pain  Goal: Patient's pain will be alleviated or reduced to the patient’s comfort goal  Outcome: Progressing   Pt states she is experiencing adequate pain control

## 2024-12-04 NOTE — ANESTHESIA TIME REPORT
Anesthesia Start and Stop Event Times       Date Time Event    12/3/2024 1625 Ready for Procedure     1629 Anesthesia Start    12/4/2024 0815 Anesthesia Stop          Responsible Staff  12/03/24 to 12/04/24      Name Role Begin End    Will Campo M.D. Anesth 1629 0706    Emiliano Harper M.D. Anesth 0706 0815          Overtime Reason:  no overtime (within assigned shift)    Comments:

## 2024-12-04 NOTE — PROGRESS NOTES
Labor Progress Note    Pamela Kerr   37w3d      Subjective:  Pt is pushing.  Uterine contractions:yes  Pain: Yes. Severity: mild    Objective:   Vitals:    24 0414 24 0434 24 0456 24 0516   BP: 117/76 (!) 160/63 129/65 126/63   Pulse: (!) 120 (!) 122 (!) 116 (!) 129   Resp:       Temp:       TempSrc:       SpO2:       Weight:       Height:         FHT: 150's cat I-II  [place]: q 2-3  SVE:c/c/+2     Membranes ruptured: .yes    Meds:   Epidural : .yes  Pitocin: .yes, 4 mu    Labs:  No results found for this or any previous visit (from the past 24 hours).    Assessment:   37w3d/complete-pushing. Exp .  Gestational hypertension without severe features   GBBS negative      Iza Crooks M.D.

## 2024-12-04 NOTE — L&D DELIVERY NOTE
Delivery note  , ALANA, female in ALANA position with agpars 8 and 9. Delayed cord x 7 min. Placenta  delivered intact, 3 vc. 2nd degree midline laceration repaired with 2-0 vicryl on ct-1 needle. Uterus firm, ebl: 300. Mom and baby doing well. SROM x 22 hours, afebrile, GBBS negative

## 2024-12-04 NOTE — CARE PLAN
Problem: Knowledge Deficit - L&D  Goal: Patient and family/caregivers will demonstrate understanding of plan of care, disease process/condition, diagnostic tests and medications  Outcome: Progressing     Problem: Risk for Infection and Impaired Wound Healing  Goal: Patient will remain free from infection  Outcome: Progressing  Note: Pt afebrile at this time, however FHR is tachy. Will continue to monitor     Problem: Pain  Goal: Patient's pain will be alleviated or reduced to the patient’s comfort goal  Outcome: Progressing  Note: Epidrual was in use for delivery and Ibuprofen was given post delivery for pain managment   The patient is Stable - Low risk of patient condition declining or worsening    Shift Goals  Clinical Goals: healthy baby and mom  Patient Goals: healthy baby and mom  Family Goals: support    Progress made toward(s) clinical / shift goals:  progressing

## 2024-12-04 NOTE — PROGRESS NOTES
"0700- Report received from GERMAINE Haynes. Pt in stirups and pushing with  and FOB at side for support. POC discussed and assumed. Fetal HR is tachy at a baseline of 165 bmp. Pt is afebrile at this time with oral thermometer. VSS.     0718- Dr Crooks at bedside to assess pushing. Pt encouraged. Orders received to turn down her epidural to 6 ml/hr to allow pt to push better.     0730- Dr Crooks at bedside to assess pushing. Pt encouraged.     0804- Dr Crooks in room for delivery.     0815-  of viable female, apgars 8/9. Pitocin bolus started per order.     0824- delivery of intact placenta which the family wants to keep.     0830- Fundus firm with light lochia rubra. Pt cleaned and new pads applied with ice pack. Pt repositioned with infant on chest.     0930- Epidural cath removed with tip intact. Pt assisted up to BR and voided a very small amount. New pads and underwear applied. Pt states she \"bottom\" is very uncomfortable and she is very hot. Pt Temp is WNL. Cool rags applied and pt repositioned for comfort on the bed.     1022- Pt . Pt states she feels \"very uncomfortable\" pain is 4/5 but she feels very hot and can't find a comfortable position at this time. She doesn't feel like her heart is racing, just that she is anxious. Pt temp is WNL at this time.     1040- Dr Santiago called regarding pt elevated HR. Pt HR is currently in the 110\"s. Dr Santiago is not concerned at this time. And want us just to watch it. She is ok with pt transferring to PPU at this time.     1055- PO tylenol given per pt request. Pt repositioned for comfort with pillows.     1157- Pt requesting to get up to the bathroom and possibly walk out in the hallway. RN assisted pt up and she felt light headed. Pt repositioned supine.  1200- /56, .     1206- Dr Santiago updated on pt status. Orders to do STAT CBC with diff,  TSH/free T4 and give her a 500 ml fluid bolus. Orders to reach out to Dr Crooks for further " needs.     1210- Report given to GERMAINE Saldaña.

## 2024-12-05 VITALS
HEART RATE: 120 BPM | WEIGHT: 200 LBS | DIASTOLIC BLOOD PRESSURE: 79 MMHG | OXYGEN SATURATION: 98 % | HEIGHT: 62 IN | SYSTOLIC BLOOD PRESSURE: 119 MMHG | RESPIRATION RATE: 18 BRPM | TEMPERATURE: 98.6 F | BODY MASS INDEX: 36.8 KG/M2

## 2024-12-05 LAB
ABO GROUP BLD: NORMAL
ABO GROUP BLD: NORMAL
BARCODED ABORH UBTYP: 9500
BARCODED ABORH UBTYP: 9500
BARCODED PRD CODE UBPRD: NORMAL
BARCODED PRD CODE UBPRD: NORMAL
BARCODED UNIT NUM UBUNT: NORMAL
BARCODED UNIT NUM UBUNT: NORMAL
BLD GP AB SCN SERPL QL: NORMAL
COMPONENT R 8504R: NORMAL
COMPONENT R 8504R: NORMAL
ERYTHROCYTE [DISTWIDTH] IN BLOOD BY AUTOMATED COUNT: 46.8 FL (ref 35.9–50)
ERYTHROCYTE [DISTWIDTH] IN BLOOD BY AUTOMATED COUNT: 49.1 FL (ref 35.9–50)
HCT VFR BLD AUTO: 19.4 % (ref 37–47)
HCT VFR BLD AUTO: 24.7 % (ref 37–47)
HGB BLD-MCNC: 6.4 G/DL (ref 12–16)
HGB BLD-MCNC: 8.4 G/DL (ref 12–16)
MCH RBC QN AUTO: 29.8 PG (ref 27–33)
MCH RBC QN AUTO: 30.1 PG (ref 27–33)
MCHC RBC AUTO-ENTMCNC: 33 G/DL (ref 32.2–35.5)
MCHC RBC AUTO-ENTMCNC: 34 G/DL (ref 32.2–35.5)
MCV RBC AUTO: 88.5 FL (ref 81.4–97.8)
MCV RBC AUTO: 90.2 FL (ref 81.4–97.8)
PLATELET # BLD AUTO: 127 K/UL (ref 164–446)
PLATELET # BLD AUTO: 157 K/UL (ref 164–446)
PMV BLD AUTO: 13.4 FL (ref 9–12.9)
PMV BLD AUTO: 14.1 FL (ref 9–12.9)
PRODUCT TYPE UPROD: NORMAL
PRODUCT TYPE UPROD: NORMAL
RBC # BLD AUTO: 2.15 M/UL (ref 4.2–5.4)
RBC # BLD AUTO: 2.79 M/UL (ref 4.2–5.4)
RH BLD: NORMAL
RH BLD: NORMAL
UNIT STATUS USTAT: NORMAL
UNIT STATUS USTAT: NORMAL
WBC # BLD AUTO: 23.9 K/UL (ref 4.8–10.8)
WBC # BLD AUTO: 29.7 K/UL (ref 4.8–10.8)

## 2024-12-05 PROCEDURE — 86901 BLOOD TYPING SEROLOGIC RH(D): CPT

## 2024-12-05 PROCEDURE — A9270 NON-COVERED ITEM OR SERVICE: HCPCS | Performed by: OBSTETRICS & GYNECOLOGY

## 2024-12-05 PROCEDURE — 86850 RBC ANTIBODY SCREEN: CPT

## 2024-12-05 PROCEDURE — 36430 TRANSFUSION BLD/BLD COMPNT: CPT

## 2024-12-05 PROCEDURE — P9016 RBC LEUKOCYTES REDUCED: HCPCS | Mod: 91

## 2024-12-05 PROCEDURE — 86900 BLOOD TYPING SEROLOGIC ABO: CPT

## 2024-12-05 PROCEDURE — 90707 MMR VACCINE SC: CPT | Performed by: OBSTETRICS & GYNECOLOGY

## 2024-12-05 PROCEDURE — 90471 IMMUNIZATION ADMIN: CPT

## 2024-12-05 PROCEDURE — 86923 COMPATIBILITY TEST ELECTRIC: CPT | Mod: 91

## 2024-12-05 PROCEDURE — 700102 HCHG RX REV CODE 250 W/ 637 OVERRIDE(OP): Performed by: OBSTETRICS & GYNECOLOGY

## 2024-12-05 PROCEDURE — 700111 HCHG RX REV CODE 636 W/ 250 OVERRIDE (IP): Performed by: OBSTETRICS & GYNECOLOGY

## 2024-12-05 PROCEDURE — 36415 COLL VENOUS BLD VENIPUNCTURE: CPT

## 2024-12-05 PROCEDURE — 3E0234Z INTRODUCTION OF SERUM, TOXOID AND VACCINE INTO MUSCLE, PERCUTANEOUS APPROACH: ICD-10-PCS | Performed by: OBSTETRICS & GYNECOLOGY

## 2024-12-05 PROCEDURE — 85027 COMPLETE CBC AUTOMATED: CPT | Mod: 91

## 2024-12-05 RX ADMIN — IBUPROFEN 800 MG: 800 TABLET, FILM COATED ORAL at 14:16

## 2024-12-05 RX ADMIN — ACETAMINOPHEN 1000 MG: 500 TABLET, FILM COATED ORAL at 10:54

## 2024-12-05 RX ADMIN — MEASLES, MUMPS, AND RUBELLA VIRUS VACCINE LIVE 0.5 ML: 1000; 12500; 1000 INJECTION, POWDER, LYOPHILIZED, FOR SUSPENSION SUBCUTANEOUS at 05:26

## 2024-12-05 RX ADMIN — IBUPROFEN 800 MG: 800 TABLET, FILM COATED ORAL at 05:28

## 2024-12-05 ASSESSMENT — PAIN DESCRIPTION - PAIN TYPE
TYPE: ACUTE PAIN

## 2024-12-05 ASSESSMENT — EDINBURGH POSTNATAL DEPRESSION SCALE (EPDS)
I HAVE BEEN SO UNHAPPY THAT I HAVE HAD DIFFICULTY SLEEPING: NOT AT ALL
I HAVE FELT SCARED OR PANICKY FOR NO GOOD REASON: NO, NOT MUCH
I HAVE LOOKED FORWARD WITH ENJOYMENT TO THINGS: AS MUCH AS I EVER DID
I HAVE BEEN ANXIOUS OR WORRIED FOR NO GOOD REASON: HARDLY EVER
THE THOUGHT OF HARMING MYSELF HAS OCCURRED TO ME: NEVER
THINGS HAVE BEEN GETTING ON TOP OF ME: NO, MOST OF THE TIME I HAVE COPED QUITE WELL
I HAVE FELT SAD OR MISERABLE: NO, NOT AT ALL
I HAVE BEEN SO UNHAPPY THAT I HAVE BEEN CRYING: NO, NEVER
I HAVE BLAMED MYSELF UNNECESSARILY WHEN THINGS WENT WRONG: NOT VERY OFTEN
I HAVE BEEN ABLE TO LAUGH AND SEE THE FUNNY SIDE OF THINGS: AS MUCH AS I ALWAYS COULD

## 2024-12-05 NOTE — DISCHARGE SUMMARY
DATE OF ADMISSION:  12/03/2024   DATE OF DISCHARGE:  12/05/2024     ADMITTING DIAGNOSES:  1.  Intrauterine pregnancy at 37 and 1/7 weeks.  2.  Gestational hypertension.  3.  Rh negative.  4.  Asthma.  5.  Group B strep negative.     Please see previously dictated history and physical.     HOSPITAL COURSE:  The patient was admitted to labor and delivery on 12/03/2024   with the above diagnoses.  She subsequently underwent a normal spontaneous   vaginal delivery of a viable female infant, Apgars 8 and 9, weight 6 pounds 6   ounces.  She did not require blood pressure medication during her induction.    She had an estimated blood loss of 300 mL. She had a small second-degree   midline episiotomy.  Postpartum, the patient began to feel unwell and had a   heart rate of 150s. She had some laboratory studies done and EKG, which just   showed sinus tachycardia.  She ultimately was sent to postpartum after   receiving a fluid bolus and her vital signs improved.  On postpartum day #1,   she was attempting to breastfeed and was voiding well.  She had profound   postpartum anemia.  Her H and H had dropped from 12 and 38 down to 6 and 19.    Orthostatic vital signs were obtained and she was not symptomatic; however,   she continued to have tachycardia and we discussed the pros, cons and risks   and benefits of blood transfusion. After discussion, the patient agrees to a   blood transfusion. If she feels better after her transfusion and I am   recommending 2 units of packed red blood cells, the patient would like to   home.  She has a blood pressure cuff at home.  She did not require any blood   pressure medications while in the hospital and noticed the symptoms of   preeclampsia.     PHYSICAL EXAMINATION ON DISCHARGE:  VITAL SIGNS:  Temperature is 96.7, pulse 112, blood pressure 111/76.  Her   orthostatic vital signs are unchanged.  ABDOMEN:  Soft, nontender.  Fundus is firm below the umbilicus.  EXTREMITIES:  Show +1 pedal  edema.  She has no cords or Homans sign.  She did   not complain of any rectal pain.     LABORATORY DATA:  Postpartum H and H of 6 and 19, platelets dropped to   127,000, white blood cell count dropped to 29,000. Rh negative, GBS negative.    EKG showed sinus tachycardia.     DISCHARGE DIAGNOSES:  1.  Intrauterine pregnancy at 37 weeks.  2.  Status post induction of labor for gestational hypertension.  3.  Status post normal spontaneous vaginal delivery.  4.  Profound postpartum anemia.  5.  Rh negative status, status post RhoGAM.     DISCHARGE INSTRUCTIONS:  1.  The patient will be discharged home after her blood transfusion.  2.  She is instructed on pelvic rest for six weeks.  3.  Use over-the-counter ibuprofen, prenatal vitamins and iron.  4.  She did receive RhoGAM.  5.  Home blood pressure parameters were reviewed.  She is currently not on   labetalol.  6.  She should follow up with Dr. Crokos in 6 weeks.        ______________________________  MD JAYRO HOLMAN/JOSE A    DD:  12/05/2024 07:10  DT:  12/05/2024 07:49    Job#:  [id number]    CC:KAITY LAYNE MD

## 2024-12-05 NOTE — PROGRESS NOTES
1000  Received report from GERMAINE Michaud at change of shift. Patient assessed and POC discussed. Patient is resting in bed. Fundus firm, lochia light.  Patient denies any needs at this time. Call light within reach, bed in lowest position. Patient is encouraged to call for pain/med interventions and any other needs.    1112  Began blood transfusion. Verified by 2 Rns    1448  Called MD Crooks for updates. Per MD, collect CBC 2 hrs after last transfusion completed and call MD with results    1825 Notified MD rCooks on CBC results. Pt ok to d/c    1910  Discharge instructions reviewed. Educated pt on s/s and risks of anemia, s/s of elevated BP. Verbalized understanding and desires discharge. Documents signed    2046  Left facility. Escorted by staff

## 2024-12-05 NOTE — CARE PLAN
The patient is Stable - Low risk of patient condition declining or worsening    Shift Goals  Clinical Goals: pain control, lochia WDL, guo  Patient Goals: Breast feeding  Family Goals: support    Progress made toward(s) clinical / shift goals:      Problem: Pain  Goal: Patient's pain will be alleviated or reduced to the patient’s comfort goal  Description: Target End Date:  Prior to discharge or change in level of care    1.  Document pain using the appropriate pain scale per order or unit policy  2.  Administer pain medications per provider order and/or assist with epidural/spinal placement as needed  3.  Pain management medications as ordered  4.  Educate and implement non-pharmacologic comfort measures (i.e. relaxation, distraction, massage, cold/heat therapy, etc.)  5.  Assess for nonverbal signs of ineffective coping with pain and offer pain medication and/or epidural anesthesia  Outcome: Progressing     Problem: Discharge Barriers/Planning  Goal: Patient's continuum of care needs are met  Description: Target End Date:  Prior to discharge or change in level of care    1.  Identify potential discharge barriers on admission and throughout hospitalization  2.  Collaborate with Case Management, , Clinical Educators, Navigators and others on the transitional care team to meet discharge needs  3.  Involve patient/family/caregivers in setting and prioritizing goals for hospitalization and discharge  4.  Ensure Flu vaccinations are addressed  5.  Inquire if patient is interested in the Meds to Bed program  6.  Ensure patient and family/caregiver are able to demonstrate use of equipment as prescribed  7.  Ensure patient and family/caregiver can verbalize understanding of patient education  8.  Explain discharge instructions and medication reconciliation to patient and family/caregiver  Outcome: Progressing       Patient is not progressing towards the following goals:

## 2024-12-05 NOTE — PROGRESS NOTES
0320- MD on call Lala notified of CBC (H/H) results. Pt asymptomatic at this time with no c/o pain, headache, or dizziness. MD Reeves gave orders for orthostatic blood pressures at this time, see flowsheets for results. Pt able to independently ambulate to bathroom. Uyen bottle and perineal care at this time. Pt with scant bleeding on pad. Pt updated on POC.

## 2024-12-05 NOTE — DISCHARGE INSTRUCTIONS

## 2024-12-05 NOTE — LACTATION NOTE
This note was copied from a baby's chart.  Initial Consult:     History:    at 37w+3d.   Maternal hx of GHTN and postpartum anemia PRBC transfusion.     History of BF: none.  This is first baby     Report of Current Breastfeeding Status: MOB reported she is most comfortable with positioning and latch in football hold.  Baby not opening wide for deep latch, desires assistance.       Breastfeeding Assistance: Provided breastfeeding assistance with: positioning of baby at the left breast in the cross cradle position.  MOB was taught to place baby tummy to tummy and nipple to nose, wedging of the breast, and how to achieve deep latch.  MOB able to hand express colostrum independently.  Infant with tight mouth and lower lip sucking.  After several attempts, Infant latched deep onto breast and suckled with nutritive suck.  MOB denied pain with latch at breast.     Provided breastfeeding education on: hunger cues, frequency/duration of breastfeeds, skin to skin, cluster feeding, shallow vs deep latch, and nutritive vs non-nutritive suck.     Breastfeeding Plan:      Continue to breastfeed on demand at least 8x in 24hrs. Wake baby and breastfeed if last feed was 3.5hrs prior.  Anticipate clusterfeeding and baby may feed more frequent and for longer periods of time.  This frequent feeding is activating hormones to make mature breastmilk.  Breastmilk should increase in volume by postpartum day 4, followed with increased diaper output.  Continue frequent skin to skin while MOB awake and attentive.     Create a comfortable and relaxing environment for breastfeeding, minimizing distractions and ensuring proper positioning for mom and baby.        Provided NN Breastfeeding Resources.

## 2024-12-05 NOTE — PROGRESS NOTES
PP day #1    S:  Pt breast feeding and voiding well.  Discussed profound pp anemia.  Pt is not orthostatic, but we did discuss the P/C/R/B of blood transfusion with regards to breast feeding and fatigue. Pt agrees to blood transfusion.  Would like to go home after transfusion. Breast feeding.  Will use OTC Motrin and PNV and iron.  Home BP parameters reviewed.  Has BP cuff.    O: T 96.7 P 112  /76  Orthostatic VS unchanged.  ABD: Soft, NT, FF below umb  EXT: +1 pedal edema, no cords or HOmans  H/H /  Plts 127K  WBC 29K  Rh neg  EKG sinus tachycardia      A/P:  PP day #1 S/P  at 37 weeks, IOL for gestational HTN, PP anemia- fair   Anemia:  Pt agrees to blood transfusion  .Pt would like to go home this afternoon if feeling better  Pelvic rest for 6 weeks  OTC Motrin and PNV and iron   Rhogam  W/U done   BP parameters reviewed. Has home BP cuff.  Is not on Labetalol now.   F/U Dr. Crooks 6 weeks   Home

## 2024-12-06 NOTE — ED PROVIDER NOTES
"IUP at 36w6d.  Elevated blood pressures at home per patient.  Possible gestational hypertension.  No preeclampsia symptoms.    S: Doing well.  She denies headache, nausea/vomiting/fevers/chills/night sweats, right upper quadrant pain, and vision changes.  She denies swelling in her hands/face/feet.  She is concerned about possible SROM.  She denies vaginal bleeding and vaginal discharge.  She reports excellent fetal movement.  She has recently started oral labetalol for her blood pressure. She took her BP at home and got a 130/105 at one reading and a 152/90 on another reading. She was informed that if her blood pressure was 150/100 to come in for assessment.    O:  BP (!) 149/88   Pulse 94   Temp 36.3 °C (97.3 °F) (Temporal)   Resp 16   Ht 1.575 m (5' 2\")   Wt 90.7 kg (200 lb)   SpO2 98%     A, A, and O x 3 NAD    Gravid    Vertex    SVE: closed.    Collected: 12/01/24 1547   Result status: Final   Resulting lab: [facility]   Reference range: Negative   Value: Negative      Latest Reference Range & Units 12/01/24 15:50   WBC 4.8 - 10.8 K/uL 12.4 (H)   RBC 4.20 - 5.40 M/uL 4.21   Hemoglobin 12.0 - 16.0 g/dL 12.7   Hematocrit 37.0 - 47.0 % 37.7   MCV 81.4 - 97.8 fL 89.5   MCH 27.0 - 33.0 pg 30.2   MCHC 32.2 - 35.5 g/dL 33.7   RDW 35.9 - 50.0 fL 46.4   Platelet Count 164 - 446 K/uL 155 (L)   MPV 9.0 - 12.9 fL 14.2 (H)   (H): Data is abnormally high  (L): Data is abnormally low     Latest Reference Range & Units 11/28/24 15:41 12/01/24 15:15   Sodium 135 - 145 mmol/L 136    Potassium 3.6 - 5.5 mmol/L 3.9    Chloride 96 - 112 mmol/L 103    Co2 20 - 33 mmol/L 21    Anion Gap 7.0 - 16.0  12.0    Glucose 65 - 99 mg/dL 75    Bun 8 - 22 mg/dL 7 (L)    Creatinine 0.50 - 1.40 mg/dL 0.51    GFR (CKD-EPI) >60 mL/min/1.73 m 2 127    Calcium 8.5 - 10.5 mg/dL 9.5    Correct Calcium 8.5 - 10.5 mg/dL 9.8    AST(SGOT) 12 - 45 U/L 16    ALT(SGPT) 2 - 50 U/L 15    Alkaline Phosphatase 30 - 99 U/L 162 (H)  "   Total Bilirubin 0.1 - 1.5 mg/dL 0.2    Albumin 3.2 - 4.9 g/dL 3.6    Total Protein 6.0 - 8.2 g/dL 6.8    Globulin 1.9 - 3.5 g/dL 3.2    A-G Ratio g/dL 1.1    Uric Acid 1.9 - 8.2 mg/dL 5.8    Creatinine, Random Urine mg/dL  63.22   Total Protein, Urine 0.0 - 15.0 mg/dL  7.0   Protein Creatinine Ratio 10 - 107 mg/g  111 (H)   (L): Data is abnormally low  (H): Data is abnormally high    TOCO: occasional contractions.    EFM: category I tracing.  Reactive and without decelerations.    A/P: 35 yo  at 36w6d who presents for evaluation of possible SROM and gestational hypertension.     Follow up tomorrow with Dr. Crooks  Likely IOL on 12/3/2024.  Preeclampsia and labor precautions reviewed.  Questions answered.